# Patient Record
Sex: FEMALE | Race: WHITE | NOT HISPANIC OR LATINO | URBAN - METROPOLITAN AREA
[De-identification: names, ages, dates, MRNs, and addresses within clinical notes are randomized per-mention and may not be internally consistent; named-entity substitution may affect disease eponyms.]

---

## 2024-10-28 NOTE — PROGRESS NOTES
Chief Complaint/Reason for Referral:  Establish Care (Low hemoglobin/Low hematocrit/)    Cecily Nicholas APRN  Provider, No Known    Records Obtained:  Records of the patients history including those obtained from unrival EMR were reviewed and summarized in detail.    Subjective    History of Present Illness    Katherine Rodriguez 44 y.o. presents to [facility] for Normocytic Anemia    Very pleasant 44-year-old female presents to the hematology department for further evaluation of her normocytic anemia.  Patient's most recent CBC reveals a white blood cell count of 8.9, hemoglobin of 8.7 hematocrit 26.9, MCV of 88, platelet count of 410.  In 2024 patient's hemoglobin was 12.6 on  patient's hemoglobin was 9.3.    Fecal occult blood testing was - 2024.    Patient's EGFR is 39 indicating stage IIIb kidney disease.    There are no iron studies, ferritin or B12 folate levels noted in record since .  Since  patient had taken iron off and on.      Patient has lost about 20 lbs not on purpose.  No abd pain, she does have heavy menses cycle for many years.  Has had PAP at health dept.  Not seen GYN in years.      Patient is  without difficulty other than anemia.  Never transfused, never had iron infusions.  Iron causes some minor GI upset.    Cancer-related family history includes Breast cancer in her paternal aunt.     Oncology/Hematology History    No history exists.     Review of Systems   Constitutional:  Positive for appetite change.   HENT: Negative.     Eyes: Negative.    Respiratory: Negative.     Cardiovascular: Negative.    Gastrointestinal: Negative.    Endocrine: Negative.    Genitourinary: Negative.    Musculoskeletal: Negative.    Skin: Negative.    Allergic/Immunologic: Negative.    Neurological: Negative.    Hematological: Negative.    Psychiatric/Behavioral:  The patient is nervous/anxious.    All other systems reviewed and are  "negative.    Current Outpatient Medications on File Prior to Visit   Medication Sig Dispense Refill    buPROPion SR (WELLBUTRIN SR) 150 MG 12 hr tablet Take 1 tablet by mouth 2 (Two) Times a Day.      Cholecalciferol (Vitamin D) 50 MCG (2000 UT) capsule Take 1 capsule by mouth Daily.      ferrous gluconate (FERGON) 240 (27 FE) MG tablet Take 1 tablet by mouth Every Other Day.      hydroCHLOROthiazide 25 MG tablet Take 1 tablet by mouth Daily.      lisinopril (PRINIVIL,ZESTRIL) 20 MG tablet Take 1 tablet by mouth Daily.       No current facility-administered medications on file prior to visit.     No Known Allergies  Past Medical History:   Diagnosis Date    Anxiety     Hypertension     Vitamin D deficiency      Past Surgical History:   Procedure Laterality Date    BLADDER REPAIR W/  SECTION      CHOLECYSTECTOMY      TONSILLECTOMY       Social History     Socioeconomic History    Marital status:    Tobacco Use    Smoking status: Every Day     Current packs/day: 1.50     Average packs/day: 1.5 packs/day for 31.8 years (47.7 ttl pk-yrs)     Types: Cigarettes     Start date:    Vaping Use    Vaping status: Never Used   Substance and Sexual Activity    Alcohol use: Not Currently    Sexual activity: Defer     Family History   Problem Relation Age of Onset    Diabetes Mother     Hypertension Father     Breast cancer Paternal Aunt     Diabetes Maternal Grandmother      Immunization History   Administered Date(s) Administered    Fluzone (or Fluarix & Flulaval for VFC) >6mos 2020    Tdap 10/20/2020     Tobacco Use: High Risk (10/29/2024)    Patient History     Smoking Tobacco Use: Every Day     Smokeless Tobacco Use: Unknown     Passive Exposure: Not on file     Objective     Vitals:    10/29/24 1113   BP: 116/63   Pulse: 97   Resp: 16   Temp: 98 °F (36.7 °C)   TempSrc: Temporal   SpO2: 100%   Weight: 93.1 kg (205 lb 3.2 oz)   Height: 163.8 cm (64.5\")   PainSc: 0-No pain      Physical Exam  Vitals " and nursing note reviewed.   Constitutional:       General: She is not in acute distress.     Appearance: Normal appearance. She is not ill-appearing.   HENT:      Head: Normocephalic.   Eyes:      Conjunctiva/sclera: Conjunctivae normal.   Cardiovascular:      Rate and Rhythm: Normal rate and regular rhythm.      Heart sounds: Normal heart sounds.   Pulmonary:      Effort: Pulmonary effort is normal.      Breath sounds: Normal breath sounds.   Abdominal:      Palpations: There is no hepatomegaly or splenomegaly.   Lymphadenopathy:      Cervical: No cervical adenopathy.      Right cervical: No superficial cervical adenopathy.     Left cervical: No superficial cervical adenopathy.      Upper Body:      Right upper body: No axillary adenopathy.      Left upper body: No axillary adenopathy.   Skin:     Coloration: Skin is not jaundiced.   Neurological:      Mental Status: She is alert.   Psychiatric:         Mood and Affect: Mood normal.         Behavior: Behavior normal. Behavior is cooperative.         Thought Content: Thought content normal.         Judgment: Judgment normal.       Wt Readings from Last 3 Encounters:   10/29/24 93.1 kg (205 lb 3.2 oz)      Body mass index is 34.68 kg/m².    ECOG score: 0         ECOG: (0) Fully Active - Able to Carry On All Pre-disease Performance Without Restriction  Fall Risk Assessment was completed, and patient is at low risk for falls.  PHQ-9 Total Score:         The patient is  experiencing fatigue. Fatigue score: 7    PT/OT Functional Screening:   Speech Functional Screening:   Rehab to be ordered:     Vitals:    10/29/24 1113   PainSc: 0-No pain           Katherine Rodriguez reports a pain score of 0.       PHQ-2 Depression Screening  Little interest or pleasure in doing things? Not at all   Feeling down, depressed, or hopeless? Not at all   PHQ-2 Total Score 0     Result Review :  The following data was reviewed by: Xavier Molina PA-C on 10/29/2024:    LABS SCANNED  (10/17/2024)  LABS SCANNED (10/07/2024)  LABS SCANNED (09/30/2024)  LABS SCANNED (03/22/2024)    No Images in the past 120 days found..       Assessment and Plan:  Diagnoses and all orders for this visit:    1. Normocytic anemia (Primary)  -     H. Pylori Antigen, Stool - Stool, Per Rectum  -     Vitamin B12 & Folate  -     Comprehensive Metabolic Panel  -     Urinalysis With Microscopic - Urine, Clean Catch  -     Occult Blood, Fecal By Immunoassay - Stool, Per Rectum  -     Reticulocytes  -     Lactate Dehydrogenase  -     Haptoglobin  -     TSH+Free T4  -     Iron Profile  -     Ferritin  -     CBC & Differential  -     Peripheral Blood Smear  -     Sedimentation Rate  -     Ambulatory Referral to Gynecology  -     US Abdomen Complete; Future  -     Manual Differential  -     LSAC Slide Creation  -     Pathology Consultation    2. Menorrhagia with regular cycle  Comments:  Recommend further evaluation with PCP/GYN - ASAP  Orders:  -     Ambulatory Referral to Gynecology    3. Tobacco use  Comments:  D/C use    4. Elevated LFTs  -     Hepatitis Panel, Acute  -     US Abdomen Complete; Future    5. Stage 3b chronic kidney disease  Comments:  Push fluids, and recommend discussion with PCP regarding nephrology consult    6. Elevated ferritin  -     Hemochromatosis Mutation    7. Anemia due to folic acid deficiency, unspecified deficiency type  -     folic acid (FOLVITE) 1 MG tablet; Take 1 tablet by mouth Daily.  Dispense: 90 tablet; Refill: 1      -Encouraged patient to remain up to date on all recommended preventative medicine services specific for their sex and age.  Some examples include:  Diabetes screening, Hypertensive screening, Immunizations, Lipid screenings (cholesterol), Depression screenings, Colorectal cancer screening, HIV screening, Cervical cancer screening, Breast cancer screening, Osteoporosis screening, Alcohol screening, Dental screening, Tobacco Use screening and Dietary screening  -Obtain labs:   Haptoglobin and LDH checking for hemolysis, retic count to assess bone marrow response, UA and stool studies to determine any occult bleeding source, peripheral blood smear to assess morphology of cells.  Sed rate (ESR) to assess inflammatory status, TSH/T4 for thyroid function.  Iron profile, ferritin, B12/Folate levels.  -Discussed iron infusions with patient, she will consider it.  -Discussed consult with GYN to discuss her heavy menses.    Patient Follow Up: 2 months with MD CONNOR spent 45 minutes caring for Katherine on this date of service. This time includes time spent by me in the following activities:preparing for the visit, reviewing tests, obtaining and/or reviewing a separately obtained history, performing a medically appropriate examination and/or evaluation , counseling and educating the patient/family/caregiver, ordering medications, tests, or procedures, documenting information in the medical record, and independently interpreting results and communicating that information with the patient/family/caregiver    Patient was given instructions and counseling regarding her condition or for health maintenance advice. Please see specific information pulled into the AVS if appropriate.

## 2024-10-29 ENCOUNTER — CONSULT (OUTPATIENT)
Dept: ONCOLOGY | Facility: HOSPITAL | Age: 44
End: 2024-10-29
Payer: COMMERCIAL

## 2024-10-29 ENCOUNTER — LAB (OUTPATIENT)
Dept: ONCOLOGY | Facility: HOSPITAL | Age: 44
End: 2024-10-29
Payer: COMMERCIAL

## 2024-10-29 VITALS
SYSTOLIC BLOOD PRESSURE: 116 MMHG | TEMPERATURE: 98 F | RESPIRATION RATE: 16 BRPM | DIASTOLIC BLOOD PRESSURE: 63 MMHG | BODY MASS INDEX: 34.19 KG/M2 | HEIGHT: 65 IN | OXYGEN SATURATION: 100 % | WEIGHT: 205.2 LBS | HEART RATE: 97 BPM

## 2024-10-29 DIAGNOSIS — R79.89 ELEVATED FERRITIN: ICD-10-CM

## 2024-10-29 DIAGNOSIS — N92.0 MENORRHAGIA WITH REGULAR CYCLE: ICD-10-CM

## 2024-10-29 DIAGNOSIS — Z72.0 TOBACCO USE: ICD-10-CM

## 2024-10-29 DIAGNOSIS — D64.9 ANEMIA, UNSPECIFIED TYPE: Primary | ICD-10-CM

## 2024-10-29 DIAGNOSIS — D52.9 ANEMIA DUE TO FOLIC ACID DEFICIENCY, UNSPECIFIED DEFICIENCY TYPE: ICD-10-CM

## 2024-10-29 DIAGNOSIS — N18.32 STAGE 3B CHRONIC KIDNEY DISEASE: ICD-10-CM

## 2024-10-29 DIAGNOSIS — R79.89 ELEVATED LFTS: ICD-10-CM

## 2024-10-29 DIAGNOSIS — D64.9 NORMOCYTIC ANEMIA: Primary | ICD-10-CM

## 2024-10-29 LAB
ALBUMIN SERPL-MCNC: 4 G/DL (ref 3.5–5.2)
ALBUMIN/GLOB SERPL: 1.1 G/DL
ALP SERPL-CCNC: 247 U/L (ref 39–117)
ALT SERPL W P-5'-P-CCNC: 92 U/L (ref 1–33)
ANION GAP SERPL CALCULATED.3IONS-SCNC: 7 MMOL/L (ref 5–15)
AST SERPL-CCNC: 88 U/L (ref 1–32)
BACTERIA UR QL AUTO: ABNORMAL /HPF
BASOPHILS # BLD AUTO: 0.02 10*3/MM3 (ref 0–0.2)
BASOPHILS NFR BLD AUTO: 0.2 % (ref 0–1.5)
BILIRUB SERPL-MCNC: 0.5 MG/DL (ref 0–1.2)
BILIRUB UR QL STRIP: NEGATIVE
BUN SERPL-MCNC: 43 MG/DL (ref 6–20)
BUN/CREAT SERPL: 21.9 (ref 7–25)
CALCIUM SPEC-SCNC: 9.3 MG/DL (ref 8.6–10.5)
CHLORIDE SERPL-SCNC: 101 MMOL/L (ref 98–107)
CLARITY UR: ABNORMAL
CO2 SERPL-SCNC: 27 MMOL/L (ref 22–29)
COLOR UR: ABNORMAL
CREAT SERPL-MCNC: 1.96 MG/DL (ref 0.57–1)
DEPRECATED RDW RBC AUTO: 50.7 FL (ref 37–54)
EGFRCR SERPLBLD CKD-EPI 2021: 31.8 ML/MIN/1.73
EOSINOPHIL # BLD AUTO: 0.08 10*3/MM3 (ref 0–0.4)
EOSINOPHIL NFR BLD AUTO: 1 % (ref 0.3–6.2)
ERYTHROCYTE [DISTWIDTH] IN BLOOD BY AUTOMATED COUNT: 15.3 % (ref 12.3–15.4)
ERYTHROCYTE [SEDIMENTATION RATE] IN BLOOD: 15 MM/HR (ref 0–20)
FERRITIN SERPL-MCNC: 606.8 NG/ML (ref 13–150)
FOLATE SERPL-MCNC: 3.4 NG/ML (ref 4.78–24.2)
GLOBULIN UR ELPH-MCNC: 3.6 GM/DL
GLUCOSE SERPL-MCNC: 94 MG/DL (ref 65–99)
GLUCOSE UR STRIP-MCNC: NEGATIVE MG/DL
HAPTOGLOB SERPL-MCNC: 348 MG/DL (ref 30–200)
HAV IGM SERPL QL IA: NORMAL
HBV CORE IGM SERPL QL IA: NORMAL
HBV SURFACE AG SERPL QL IA: NORMAL
HCT VFR BLD AUTO: 26.1 % (ref 34–46.6)
HCV AB SER QL: NORMAL
HGB BLD-MCNC: 8.2 G/DL (ref 12–15.9)
HGB UR QL STRIP.AUTO: NEGATIVE
HYALINE CASTS UR QL AUTO: ABNORMAL /LPF
IMM GRANULOCYTES # BLD AUTO: 0.03 10*3/MM3 (ref 0–0.05)
IMM GRANULOCYTES NFR BLD AUTO: 0.4 % (ref 0–0.5)
IRON 24H UR-MRATE: 26 MCG/DL (ref 37–145)
IRON SATN MFR SERPL: 7 % (ref 20–50)
KETONES UR QL STRIP: NEGATIVE
LDH SERPL-CCNC: 434 U/L (ref 135–214)
LEUKOCYTE ESTERASE UR QL STRIP.AUTO: ABNORMAL
LYMPHOCYTES # BLD AUTO: 1.53 10*3/MM3 (ref 0.7–3.1)
LYMPHOCYTES # BLD MANUAL: 1.41 10*3/MM3 (ref 0.7–3.1)
LYMPHOCYTES NFR BLD AUTO: 18.5 % (ref 19.6–45.3)
LYMPHOCYTES NFR BLD MANUAL: 8 % (ref 5–12)
MCH RBC QN AUTO: 28 PG (ref 26.6–33)
MCHC RBC AUTO-ENTMCNC: 31.4 G/DL (ref 31.5–35.7)
MCV RBC AUTO: 89.1 FL (ref 79–97)
MONOCYTES # BLD AUTO: 0.64 10*3/MM3 (ref 0.1–0.9)
MONOCYTES # BLD: 0.66 10*3/MM3 (ref 0.1–0.9)
MONOCYTES NFR BLD AUTO: 7.7 % (ref 5–12)
NEUTROPHILS # BLD AUTO: 6.22 10*3/MM3 (ref 1.7–7)
NEUTROPHILS NFR BLD AUTO: 5.99 10*3/MM3 (ref 1.7–7)
NEUTROPHILS NFR BLD AUTO: 72.2 % (ref 42.7–76)
NEUTROPHILS NFR BLD MANUAL: 75 % (ref 42.7–76)
NITRITE UR QL STRIP: NEGATIVE
PATHOLOGY REVIEW: YES
PH UR STRIP.AUTO: 5.5 [PH] (ref 5–8)
PLAT MORPH BLD: NORMAL
PLATELET # BLD AUTO: 391 10*3/MM3 (ref 140–450)
PMV BLD AUTO: 9.8 FL (ref 6–12)
POTASSIUM SERPL-SCNC: 4.4 MMOL/L (ref 3.5–5.2)
PROT SERPL-MCNC: 7.6 G/DL (ref 6–8.5)
PROT UR QL STRIP: ABNORMAL
RBC # BLD AUTO: 2.93 10*6/MM3 (ref 3.77–5.28)
RBC # UR STRIP: ABNORMAL /HPF
RBC MORPH BLD: NORMAL
REF LAB TEST METHOD: ABNORMAL
RETICS # AUTO: 0.07 10*6/MM3 (ref 0.02–0.13)
RETICS/RBC NFR AUTO: 2.21 % (ref 0.7–1.9)
SODIUM SERPL-SCNC: 135 MMOL/L (ref 136–145)
SP GR UR STRIP: 1.02 (ref 1–1.03)
SQUAMOUS #/AREA URNS HPF: ABNORMAL /HPF
T4 FREE SERPL-MCNC: 1.68 NG/DL (ref 0.92–1.68)
TIBC SERPL-MCNC: 361 MCG/DL (ref 298–536)
TRANSFERRIN SERPL-MCNC: 242 MG/DL (ref 200–360)
TSH SERPL DL<=0.05 MIU/L-ACNC: 0.88 UIU/ML (ref 0.27–4.2)
UROBILINOGEN UR QL STRIP: ABNORMAL
VARIANT LYMPHS NFR BLD MANUAL: 17 % (ref 19.6–45.3)
VIT B12 BLD-MCNC: 598 PG/ML (ref 211–946)
WBC # UR STRIP: ABNORMAL /HPF
WBC MORPH BLD: NORMAL
WBC NRBC COR # BLD AUTO: 8.29 10*3/MM3 (ref 3.4–10.8)

## 2024-10-29 PROCEDURE — 83615 LACTATE (LD) (LDH) ENZYME: CPT | Performed by: PHYSICIAN ASSISTANT

## 2024-10-29 PROCEDURE — G0463 HOSPITAL OUTPT CLINIC VISIT: HCPCS | Performed by: PHYSICIAN ASSISTANT

## 2024-10-29 PROCEDURE — 85025 COMPLETE CBC W/AUTO DIFF WBC: CPT | Performed by: PHYSICIAN ASSISTANT

## 2024-10-29 PROCEDURE — 80053 COMPREHEN METABOLIC PANEL: CPT | Performed by: PHYSICIAN ASSISTANT

## 2024-10-29 PROCEDURE — 82728 ASSAY OF FERRITIN: CPT | Performed by: PHYSICIAN ASSISTANT

## 2024-10-29 PROCEDURE — 80074 ACUTE HEPATITIS PANEL: CPT | Performed by: PHYSICIAN ASSISTANT

## 2024-10-29 PROCEDURE — 82607 VITAMIN B-12: CPT | Performed by: PHYSICIAN ASSISTANT

## 2024-10-29 PROCEDURE — 84443 ASSAY THYROID STIM HORMONE: CPT | Performed by: PHYSICIAN ASSISTANT

## 2024-10-29 PROCEDURE — 81256 HFE GENE: CPT | Performed by: PHYSICIAN ASSISTANT

## 2024-10-29 PROCEDURE — 83010 ASSAY OF HAPTOGLOBIN QUANT: CPT | Performed by: PHYSICIAN ASSISTANT

## 2024-10-29 PROCEDURE — 84466 ASSAY OF TRANSFERRIN: CPT | Performed by: PHYSICIAN ASSISTANT

## 2024-10-29 PROCEDURE — 82746 ASSAY OF FOLIC ACID SERUM: CPT | Performed by: PHYSICIAN ASSISTANT

## 2024-10-29 PROCEDURE — 85045 AUTOMATED RETICULOCYTE COUNT: CPT | Performed by: PHYSICIAN ASSISTANT

## 2024-10-29 PROCEDURE — 83540 ASSAY OF IRON: CPT | Performed by: PHYSICIAN ASSISTANT

## 2024-10-29 PROCEDURE — 84439 ASSAY OF FREE THYROXINE: CPT | Performed by: PHYSICIAN ASSISTANT

## 2024-10-29 PROCEDURE — 81001 URINALYSIS AUTO W/SCOPE: CPT | Performed by: PHYSICIAN ASSISTANT

## 2024-10-29 PROCEDURE — 36415 COLL VENOUS BLD VENIPUNCTURE: CPT | Performed by: PHYSICIAN ASSISTANT

## 2024-10-29 PROCEDURE — 85652 RBC SED RATE AUTOMATED: CPT | Performed by: PHYSICIAN ASSISTANT

## 2024-10-29 RX ORDER — LISINOPRIL 20 MG/1
20 TABLET ORAL DAILY
COMMUNITY

## 2024-10-29 RX ORDER — BUPROPION HYDROCHLORIDE 150 MG/1
150 TABLET, EXTENDED RELEASE ORAL ONCE
COMMUNITY

## 2024-10-29 RX ORDER — MULTIVIT-MIN/IRON/FOLIC ACID/K 18-600-40
1 CAPSULE ORAL DAILY
COMMUNITY

## 2024-10-29 RX ORDER — QUINIDINE GLUCONATE 324 MG
240 TABLET, EXTENDED RELEASE ORAL EVERY OTHER DAY
COMMUNITY

## 2024-10-29 RX ORDER — HYDROCHLOROTHIAZIDE 25 MG/1
25 TABLET ORAL DAILY
COMMUNITY

## 2024-10-30 ENCOUNTER — TELEPHONE (OUTPATIENT)
Dept: ONCOLOGY | Facility: HOSPITAL | Age: 44
End: 2024-10-30

## 2024-10-30 LAB
CYTO UR: NORMAL
LAB AP CASE REPORT: NORMAL
LAB AP CLINICAL INFORMATION: NORMAL
PATH REPORT.FINAL DX SPEC: NORMAL
PATH REPORT.GROSS SPEC: NORMAL

## 2024-10-30 RX ORDER — FOLIC ACID 1 MG/1
1 TABLET ORAL DAILY
Qty: 90 TABLET | Refills: 1 | Status: SHIPPED | OUTPATIENT
Start: 2024-10-30

## 2024-10-30 NOTE — TELEPHONE ENCOUNTER
Caller: Katherine Rodriguez    Relationship: Self    Best call back number: [phone] CALL AFTER 4 PM    What test was performed: LAB    When was the test performed: 10/29    Where was the test performed: [facility]

## 2024-10-31 ENCOUNTER — PATIENT ROUNDING (BHMG ONLY) (OUTPATIENT)
Dept: ONCOLOGY | Facility: HOSPITAL | Age: 44
End: 2024-10-31
Payer: COMMERCIAL

## 2024-10-31 NOTE — PROGRESS NOTES
October 31, 2024    Hello, may I speak with Katherine Rodriguez?    My name is Coleen.      I am  with [facility]  [address] [place], [place] [address]  [place] [address] [phone].    Before we get started may I verify your date of birth? 1980    I am calling to officially welcome you to our practice and ask about your recent visit. Is this a good time to talk? NO- Sent [employer] Message    Tell me about your visit with us. What things went well?         We're always looking for ways to make our patients' experiences even better. Do you have recommendations on ways we may improve?      Overall were you satisfied with your first visit to our practice?        I appreciate you taking the time to speak with me today. Is there anything else I can do for you?       Thank you, and have a great day.

## 2024-11-01 ENCOUNTER — TELEPHONE (OUTPATIENT)
Dept: OBSTETRICS AND GYNECOLOGY | Facility: CLINIC | Age: 44
End: 2024-11-01

## 2024-11-01 ENCOUNTER — OFFICE VISIT (OUTPATIENT)
Dept: OBSTETRICS AND GYNECOLOGY | Facility: CLINIC | Age: 44
End: 2024-11-01
Payer: COMMERCIAL

## 2024-11-01 VITALS
DIASTOLIC BLOOD PRESSURE: 74 MMHG | HEART RATE: 83 BPM | BODY MASS INDEX: 33.66 KG/M2 | HEIGHT: 65 IN | SYSTOLIC BLOOD PRESSURE: 115 MMHG | WEIGHT: 202 LBS

## 2024-11-01 DIAGNOSIS — Z12.4 PAPANICOLAOU SMEAR: ICD-10-CM

## 2024-11-01 DIAGNOSIS — D50.9 IRON DEFICIENCY ANEMIA, UNSPECIFIED IRON DEFICIENCY ANEMIA TYPE: ICD-10-CM

## 2024-11-01 DIAGNOSIS — N92.0 MENORRHAGIA WITH REGULAR CYCLE: Primary | ICD-10-CM

## 2024-11-01 PROCEDURE — G0123 SCREEN CERV/VAG THIN LAYER: HCPCS | Performed by: NURSE PRACTITIONER

## 2024-11-01 PROCEDURE — 87624 HPV HI-RISK TYP POOLED RSLT: CPT | Performed by: NURSE PRACTITIONER

## 2024-11-01 NOTE — TELEPHONE ENCOUNTER
At check out patient advised, check out staff (Danay Matson) she wanted to have her US in [place], as that is closer to her.  Called [facility] [phone] (option for scheduling) obtained fax# [phone] - faxed Ultrasound order, confirmation received and scanned to chart.   Patient was called, rcsushma her voicemail:  Left messge advising the Ultrasound order had been faxed to [facility] there in [place] and she would have to call and schedule or go by to schedule the Ultrasound.  Once she has the Ultrasound and we receive the report, show would be called with the results.    Also, sent patient a [employer] message, as well

## 2024-11-01 NOTE — PROGRESS NOTES
GYN Problem/Follow Up Visit    Chief Complaint   Patient presents with    Menorrhagia           HPI  Katherine Rodriguez is a 44 y.o. female, , who presents for was seen by PCP for fatigue, diagnosed with anemia, elevated liver enzymes, elevated kidney markers.   Has experienced heavy periods with blood clots all her life.   Menses monthly, lasting 7-9 days, on heavy days change super tampon every 3-4 hours. Mod to severe menstrual cramps.     Tubal ligation after last child   Nicotine dependence   Started on iron and folic acid supplement    Vitamin B12 & Folate (10/29/2024 12:08)    TSH+Free T4 (10/29/2024 12:08)    Iron Profile (10/29/2024 12:08)     Comprehensive Metabolic Panel (10/29/2024 12:08)     Not sexually active over 10 years, denies concerns for STI     Additional OB/GYN History   Patient's last menstrual period was 10/14/2024.  Current contraception: contraceptive methods: Tubal ligation    Past Medical History:   Diagnosis Date    Anxiety     Hypertension     Multiple gestation 1999    Normocytic anemia     Varicella     Whwn i was a younger child.    Vitamin D deficiency       Past Surgical History:   Procedure Laterality Date    BLADDER REPAIR W/  SECTION       SECTION  2013    CHOLECYSTECTOMY      LAPAROSCOPIC CHOLECYSTECTOMY      TONSILLECTOMY      TUBAL ABDOMINAL LIGATION  2021      Family History   Problem Relation Age of Onset    Hypertension Father     Diabetes Mother     Diabetes Maternal Grandmother     Breast cancer Paternal Aunt     Ovarian cancer Neg Hx     Uterine cancer Neg Hx     Colon cancer Neg Hx     Deep vein thrombosis Neg Hx     Pulmonary embolism Neg Hx      Allergies as of 2024    (No Known Allergies)      The additional following portions of the patient's history were reviewed and updated as appropriate: allergies, current medications, past family history, past medical history, past social history, past  "surgical history, and problem list.    Review of Systems    See HPI for pertinent ROS    Objective   /74   Pulse 83   Ht 163.8 cm (64.5\")   Wt 91.6 kg (202 lb)   LMP 10/14/2024 Comment: Lasting from 7-9 days, heavy first 3 days, changing every 4 hrs, big clots  Breastfeeding No   BMI 34.14 kg/m²     Physical Exam  Vitals and nursing note reviewed. Exam conducted with a chaperone present.   Constitutional:       Appearance: Normal appearance.   Cardiovascular:      Rate and Rhythm: Normal rate.   Pulmonary:      Effort: Pulmonary effort is normal.   Genitourinary:     General: Normal vulva.      Vagina: Normal.      Cervix: Normal.      Uterus: Normal.       Adnexa: Right adnexa normal and left adnexa normal.   Lymphadenopathy:      Lower Body: No right inguinal adenopathy. No left inguinal adenopathy.   Skin:     General: Skin is warm and dry.   Neurological:      Mental Status: She is alert and oriented to person, place, and time.        Assessment and Plan    Diagnoses and all orders for this visit:    1. Menorrhagia with regular cycle (Primary)  -     US Non-ob Transvaginal; Future    2. Iron deficiency anemia, unspecified iron deficiency anemia type  -     US Non-ob Transvaginal; Future    3. Papanicolaou smear  -     IgP, Aptima HPV        Counseling:  All treatment options with regard to pt hx NLT hormonal, surgical, expectant R/B/A/SE/E   Pelvic ultrasound to evaluate for structural cause menorrhagia/DEBBY, Follow up with PCP as planned for further evaluation of anemia, abnormal kidney and liver markers       She understands the importance of having any ordered tests to be performed in a timely fashion.  The risks of not performing them include, but are not limited to, advanced cancer stages, bone loss from osteoporosis and/or subsequent increase in morbidity and/or mortality.  She is encouraged to review her results online and/or contact or office if she has questions.     Follow Up:  Return for " WWE, I will call with results of ultrasound and plan of care.        Kristie Cruz, APRN  11/01/2024

## 2024-11-05 NOTE — TELEPHONE ENCOUNTER
Spoke to patient she is scheduled to have US 11/08/24 at 03:30pm cst at [facility] in [place], [place].  Patient was readvised once we receive the report, someone would call her with the results.

## 2024-11-06 LAB
HFE GENE MUT ANL BLD/T: NORMAL
IMP & REVIEW OF LAB RESULTS: NORMAL

## 2024-11-07 PROBLEM — Z14.8 CARRIER OF HEMOCHROMATOSIS HFE GENE MUTATION: Status: ACTIVE | Noted: 2024-11-07

## 2024-11-07 LAB
CYTOLOGIST CVX/VAG CYTO: NORMAL
CYTOLOGY CVX/VAG DOC CYTO: NORMAL
CYTOLOGY CVX/VAG DOC THIN PREP: NORMAL
DX ICD CODE: NORMAL
HPV I/H RISK 4 DNA CVX QL PROBE+SIG AMP: NEGATIVE
Lab: NORMAL
OTHER STN SPEC: NORMAL
STAT OF ADQ CVX/VAG CYTO-IMP: NORMAL

## 2024-11-08 DIAGNOSIS — A59.01 TRICHOMONAS VAGINALIS (TV) INFECTION: Primary | ICD-10-CM

## 2024-11-08 RX ORDER — METRONIDAZOLE 500 MG/1
500 TABLET ORAL 2 TIMES DAILY
Qty: 14 TABLET | Refills: 0 | Status: SHIPPED | OUTPATIENT
Start: 2024-11-08 | End: 2024-11-15

## 2024-11-19 ENCOUNTER — HOSPITAL ENCOUNTER (OUTPATIENT)
Dept: OTHER | Facility: HOSPITAL | Age: 44
Discharge: HOME | End: 2024-11-19

## 2024-11-20 ENCOUNTER — TELEPHONE (OUTPATIENT)
Dept: ONCOLOGY | Facility: HOSPITAL | Age: 44
End: 2024-11-20
Payer: COMMERCIAL

## 2024-11-20 NOTE — TELEPHONE ENCOUNTER
Caller: Katherine Rodriguez    Relationship: Self    Best call back number: [phone]    What is the best time to reach you: ANYTIME    Who are you requesting to speak with (clinical staff, provider,  specific staff member): TRESSA      What was the call regarding: PT ADVISES [facility] IS NOT TAKING NEW PTS AT THIS TIME, THEY ARE ON DIVERSION.  [facility] HAS A BED AND IS WAITING FOR HER, BUT WAS TOLD THEY CANNOT HELP HER THAT SHE NEEDS TO GO TO [facility].  PT NEEDS DIRECTION ASAP

## 2024-12-02 ENCOUNTER — TELEPHONE (OUTPATIENT)
Dept: OBSTETRICS AND GYNECOLOGY | Facility: CLINIC | Age: 44
End: 2024-12-02
Payer: COMMERCIAL

## 2024-12-02 NOTE — TELEPHONE ENCOUNTER
----- Message from Kristie Anthony sent at 11/20/2024  8:05 PM EST -----  Patient has been hospitalized for a renal mass. She was seen recently by gyn and worked up for heavy and painful menses. She was diagnosed with trichomonas and scheduled for ABRAM in January, additionally Her pelvic ultrasound was reviewed which demonstrates a small benign uterine fibroid, otherwise normal. Her pap smear was normal. If you will follow up with her next week once she is discharged to let her know we can follow up with her on the results and her menstrual concerns in January at her fu appt. IF she would like to be seen sooner, please accommodate.

## 2024-12-02 NOTE — TELEPHONE ENCOUNTER
Patient called back and informed of message from Kristie. She wants to keep her appointment as scheduled for now and will call if any issues and needs to be seen sooner.

## 2024-12-13 ENCOUNTER — TELEPHONE (OUTPATIENT)
Dept: OBSTETRICS AND GYNECOLOGY | Facility: CLINIC | Age: 44
End: 2024-12-13
Payer: COMMERCIAL

## 2024-12-13 NOTE — TELEPHONE ENCOUNTER
"Provider: Kristie Cruz, APRN     Caller: Katherine Rodriguez \"CRYSTAL\"  Female, 44 y.o., 1980  MRN: [id number]  CSN: [id number]  Phone: [phone]    Relationship to Patient: SELF        Reason for Call: PT IS REQ A CALL BACK FROM CLINICAL STAFF REGARDING HER MOST RECENT LAB RESULTS, PT STATES AT THE TIME THEY WERE RESULTED TO HER SHE HAD A LOT GOING ON AND DID NOT RETAIN THE CONVERSATION       "

## 2024-12-30 ENCOUNTER — TELEPHONE (OUTPATIENT)
Dept: ONCOLOGY | Facility: HOSPITAL | Age: 44
End: 2024-12-30
Payer: COMMERCIAL

## 2024-12-30 NOTE — TELEPHONE ENCOUNTER
Caller: Katherine Rodriguez    Relationship to patient: Self    Best call back number: [phone]    Chief complaint: SCHEDULING     Type of visit: FOLLOW UP    Requested date: NEXT AVLIABLE     If rescheduling, when is the original appointment: 12-

## 2024-12-31 ENCOUNTER — TELEPHONE (OUTPATIENT)
Dept: OBSTETRICS AND GYNECOLOGY | Facility: CLINIC | Age: 44
End: 2024-12-31
Payer: COMMERCIAL

## 2025-01-23 NOTE — PROGRESS NOTES
GYN Problem/Follow Up Visit    Chief Complaint   Patient presents with    Follow-up           HPI  Katherine Rodriguez is a 44 y.o. female, , who presents for test od cure trichomonas, completed treatment, symptoms have resolved.     IgP, Aptima HPV (2024 10:35)     Burning with urination for the past 1-2 days, treated for UTI 2 weeks ago per PCP, s/p left kidney cancer     Additional OB/GYN History   Patient's last menstrual period was 2024.  Current contraception: contraceptive methods: Condoms and Tubal ligation    Past Medical History:   Diagnosis Date    Anxiety     Cancer 2024    Kidney cancer. Took out my left kidney.    History of transfusion 2024    Had to get blood for surgery and after for my hemoglobin being low.    Hypertension     Multiple gestation 1999    Normocytic anemia     Trichomonas vaginalis infection 2024    Varicella     Whwn i was a younger child.    Vitamin D deficiency       Past Surgical History:   Procedure Laterality Date    BLADDER REPAIR W/  SECTION       SECTION  2013    CHOLECYSTECTOMY      LAPAROSCOPIC CHOLECYSTECTOMY      NEPHRECTOMY RADICAL Left 2024    cancer    TONSILLECTOMY      TUBAL ABDOMINAL LIGATION  2021      Family History   Problem Relation Age of Onset    Hypertension Father     Diabetes Mother     Diabetes Maternal Grandmother     Breast cancer Paternal Aunt     Ovarian cancer Neg Hx     Uterine cancer Neg Hx     Colon cancer Neg Hx     Deep vein thrombosis Neg Hx     Pulmonary embolism Neg Hx      Allergies as of 2025    (No Known Allergies)      The additional following portions of the patient's history were reviewed and updated as appropriate: allergies, current medications, past family history, past medical history, past social history, past surgical history, and problem list.    Review of Systems    See HPI for pertinent ROS    Objective   /96   Pulse 116    Wt 75.8 kg (167 lb)   LMP 11/18/2024   BMI 28.22 kg/m²     Physical Exam  Vitals and nursing note reviewed. Exam conducted with a chaperone present.   Constitutional:       Appearance: Normal appearance.   Cardiovascular:      Rate and Rhythm: Normal rate.   Pulmonary:      Effort: Pulmonary effort is normal.   Genitourinary:     General: Normal vulva.      Vagina: Normal. Vaginal discharge (dark old blood in vault) present.      Cervix: Normal.      Uterus: Normal.       Adnexa: Right adnexa normal and left adnexa normal.   Lymphadenopathy:      Lower Body: No right inguinal adenopathy. No left inguinal adenopathy.   Skin:     General: Skin is warm and dry.   Neurological:      Mental Status: She is alert and oriented to person, place, and time.       Assessment and Plan    Diagnoses and all orders for this visit:    1. Trichomonas vaginalis (TV) infection (Primary)  -     Gardnerella vaginalis, Trichomonas vaginalis, Candida albicans, DNA - Swab, Vagina    2. Dysuria  -     Urinalysis With Culture If Indicated -      Counseling:  SAFE SEX/condoms importance reviewed.    Increase water an limit caffeine, await urine culture    Follow Up:  Return if symptoms worsen or fail to improve.        Kristie Cruz, APRN  02/04/2025

## 2025-02-04 ENCOUNTER — OFFICE VISIT (OUTPATIENT)
Dept: ONCOLOGY | Facility: HOSPITAL | Age: 45
End: 2025-02-04
Payer: COMMERCIAL

## 2025-02-04 ENCOUNTER — OFFICE VISIT (OUTPATIENT)
Dept: OBSTETRICS AND GYNECOLOGY | Facility: CLINIC | Age: 45
End: 2025-02-04
Payer: COMMERCIAL

## 2025-02-04 VITALS
SYSTOLIC BLOOD PRESSURE: 149 MMHG | WEIGHT: 168.87 LBS | TEMPERATURE: 98 F | BODY MASS INDEX: 28.83 KG/M2 | HEART RATE: 92 BPM | HEIGHT: 64 IN | DIASTOLIC BLOOD PRESSURE: 94 MMHG | OXYGEN SATURATION: 100 % | RESPIRATION RATE: 18 BRPM

## 2025-02-04 VITALS
DIASTOLIC BLOOD PRESSURE: 89 MMHG | BODY MASS INDEX: 28.22 KG/M2 | WEIGHT: 167 LBS | HEART RATE: 91 BPM | SYSTOLIC BLOOD PRESSURE: 135 MMHG

## 2025-02-04 DIAGNOSIS — E83.110 HEREDITARY HEMOCHROMATOSIS: ICD-10-CM

## 2025-02-04 DIAGNOSIS — N17.9 AKI (ACUTE KIDNEY INJURY): ICD-10-CM

## 2025-02-04 DIAGNOSIS — A59.01 TRICHOMONAS VAGINALIS (TV) INFECTION: Primary | ICD-10-CM

## 2025-02-04 DIAGNOSIS — C64.2 RENAL CELL CARCINOMA OF LEFT KIDNEY: Primary | ICD-10-CM

## 2025-02-04 DIAGNOSIS — R30.0 DYSURIA: ICD-10-CM

## 2025-02-04 DIAGNOSIS — D64.9 ANEMIA, UNSPECIFIED TYPE: ICD-10-CM

## 2025-02-04 LAB
BACTERIA UR QL AUTO: ABNORMAL /HPF
BILIRUB UR QL STRIP: NEGATIVE
CANDIDA SPECIES: NEGATIVE
CLARITY UR: ABNORMAL
COLOR UR: YELLOW
GARDNERELLA VAGINALIS: NEGATIVE
GLUCOSE UR STRIP-MCNC: NEGATIVE MG/DL
HGB UR QL STRIP.AUTO: ABNORMAL
HOLD SPECIMEN: NORMAL
HYALINE CASTS UR QL AUTO: ABNORMAL /LPF
KETONES UR QL STRIP: ABNORMAL
LEUKOCYTE ESTERASE UR QL STRIP.AUTO: ABNORMAL
NITRITE UR QL STRIP: NEGATIVE
PH UR STRIP.AUTO: 6.5 [PH] (ref 5–8)
PROT UR QL STRIP: ABNORMAL
RBC # UR STRIP: ABNORMAL /HPF
REF LAB TEST METHOD: ABNORMAL
SP GR UR STRIP: 1.02 (ref 1–1.03)
SQUAMOUS #/AREA URNS HPF: ABNORMAL /HPF
T VAGINALIS DNA VAG QL PROBE+SIG AMP: NEGATIVE
UROBILINOGEN UR QL STRIP: ABNORMAL
WBC # UR STRIP: ABNORMAL /HPF

## 2025-02-04 PROCEDURE — 81001 URINALYSIS AUTO W/SCOPE: CPT | Performed by: NURSE PRACTITIONER

## 2025-02-04 PROCEDURE — 87660 TRICHOMONAS VAGIN DIR PROBE: CPT | Performed by: NURSE PRACTITIONER

## 2025-02-04 PROCEDURE — 87480 CANDIDA DNA DIR PROBE: CPT | Performed by: NURSE PRACTITIONER

## 2025-02-04 PROCEDURE — G0463 HOSPITAL OUTPT CLINIC VISIT: HCPCS | Performed by: INTERNAL MEDICINE

## 2025-02-04 PROCEDURE — 87086 URINE CULTURE/COLONY COUNT: CPT | Performed by: NURSE PRACTITIONER

## 2025-02-04 PROCEDURE — 87510 GARDNER VAG DNA DIR PROBE: CPT | Performed by: NURSE PRACTITIONER

## 2025-02-04 RX ORDER — ONDANSETRON 8 MG/1
8 TABLET, FILM COATED ORAL 3 TIMES DAILY PRN
Qty: 30 TABLET | Refills: 5 | Status: SHIPPED | OUTPATIENT
Start: 2025-02-04

## 2025-02-04 RX ORDER — APIXABAN 5 MG/1
5 TABLET, FILM COATED ORAL 2 TIMES DAILY
COMMUNITY
Start: 2024-12-15

## 2025-02-04 RX ORDER — SODIUM CHLORIDE 9 MG/ML
20 INJECTION, SOLUTION INTRAVENOUS ONCE
OUTPATIENT
Start: 2025-02-04

## 2025-02-04 NOTE — PROGRESS NOTES
Chief Complaint/Reason for Referral:  FOLLOW UP 2    Provider, No Known  Provider, No Known    Records Obtained:  Records of the patients history including those obtained from [employer] EMR were reviewed and summarized in detail.    Subjective    History of Present Illness    Katherine Rodriguez 44 y.o. presents to [facility] for Normocytic Anemia    Very pleasant 44-year-old female presents to the hematology department for further evaluation of her normocytic anemia.  Hx of CKD IIIb. No abd pain, she does have heavy menses cycle for many years.  Has had PAP at health dept.     Patient had radical left nephrectomy on 6 December 2024.  This showed clear-cell renal cell carcinoma with 0 of 17 lymph nodes involved.  Renal vein margin positive for carcinoma.  Adrenal gland with no malignancy.  Pathologic pT3b N0.  Grade 4.  Stage III.    Patient has recovered well from surgery.  Denies any bleeding.  Still has stent in place.  Has seen Dr. Deal with urology for follow-up.  Has follow-up planned on the 18th as well.  No acute concerns today.  Agreeable to starting immunotherapy for her RCC.     Hematology History  Patient's most recent CBC reveals a white blood cell count of 8.9, hemoglobin of 8.7 hematocrit 26.9, MCV of 88, platelet count of 410.  In March 2024 patient's hemoglobin was 12.6 on October 1 patient's hemoglobin was 9.3.    October 7, 2024: FOBT negative    10/29/24: WBC 8.29, hgb 8.2, plt 391. Ferritin 606,  iron sat 7%, TIBC 361. LDH and hapto elevated. B12 normal at 598, folate low at 3.4 (supplementation started)  Hemochromatosis testing: c.845G>A (p.Nyp684Hcf) - Detected, heterozygous   c.187C>G (p.Cpj60Hrd) - Detected, heterozygous   c.193A>T (p.Str99Hyz) - Not Detected         Cancer-related family history includes Breast cancer in her paternal aunt. There is no history of Ovarian cancer, Uterine cancer, or Colon cancer.     Oncology/Hematology History    No history  exists.     Review of Systems   Constitutional:  Negative for appetite change, diaphoresis, fatigue, fever, unexpected weight gain and unexpected weight loss.   HENT: Negative.  Negative for hearing loss, mouth sores, sore throat, swollen glands, trouble swallowing and voice change.    Eyes: Negative.  Negative for blurred vision.   Respiratory: Negative.  Negative for cough, shortness of breath and wheezing.    Cardiovascular: Negative.  Negative for chest pain and palpitations.   Gastrointestinal: Negative.  Negative for abdominal pain, blood in stool, constipation, diarrhea, nausea and vomiting.   Endocrine: Negative.  Negative for cold intolerance and heat intolerance.   Genitourinary: Negative.  Negative for difficulty urinating, dysuria, frequency, hematuria and urinary incontinence.   Musculoskeletal: Negative.  Negative for arthralgias, back pain and myalgias.   Skin: Negative.  Negative for rash, skin lesions and wound.   Allergic/Immunologic: Negative.    Neurological: Negative.  Negative for dizziness, seizures, weakness, numbness and headache.   Hematological: Negative.  Does not bruise/bleed easily.   Psychiatric/Behavioral:  Negative for depressed mood. The patient is nervous/anxious.    All other systems reviewed and are negative.    Current Outpatient Medications on File Prior to Visit   Medication Sig Dispense Refill    buPROPion SR (WELLBUTRIN SR) 150 MG 12 hr tablet Take 1 tablet by mouth 1 (One) Time. Once a day      Cholecalciferol (Vitamin D) 50 MCG (2000 UT) capsule Take 1 capsule by mouth Daily.      ferrous gluconate (FERGON) 240 (27 FE) MG tablet Take 1 tablet by mouth Every Other Day.      folic acid (FOLVITE) 1 MG tablet Take 1 tablet by mouth Daily. 90 tablet 1    hydroCHLOROthiazide 25 MG tablet Take 1 tablet by mouth Daily.      lisinopril (PRINIVIL,ZESTRIL) 20 MG tablet Take 1 tablet by mouth Daily.       No current facility-administered medications on file prior to visit.     No  Known Allergies  Past Medical History:   Diagnosis Date    Anxiety     Cancer 2024    Kidney cancer. Took out my left kidney.    History of transfusion 2024    Had to get blood for surgery and after for my hemoglobin being low.    Hypertension     Multiple gestation 1999    Normocytic anemia     Trichomonas vaginalis infection 2024    Varicella     Whwn i was a younger child.    Vitamin D deficiency      Past Surgical History:   Procedure Laterality Date    BLADDER REPAIR W/  SECTION       SECTION  2013    CHOLECYSTECTOMY      LAPAROSCOPIC CHOLECYSTECTOMY      NEPHRECTOMY RADICAL Left 2024    cancer    TONSILLECTOMY      TUBAL ABDOMINAL LIGATION  2021     Social History     Socioeconomic History    Marital status:    Tobacco Use    Smoking status: Every Day     Current packs/day: 1.50     Average packs/day: 1.5 packs/day for 32.1 years (48.1 ttl pk-yrs)     Types: Cigarettes     Start date: 1993   Vaping Use    Vaping status: Never Used   Substance and Sexual Activity    Alcohol use: Never    Drug use: Never    Sexual activity: Not Currently     Partners: Male     Birth control/protection: Condom, Tubal ligation     Family History   Problem Relation Age of Onset    Hypertension Father     Diabetes Mother     Diabetes Maternal Grandmother     Breast cancer Paternal Aunt     Ovarian cancer Neg Hx     Uterine cancer Neg Hx     Colon cancer Neg Hx     Deep vein thrombosis Neg Hx     Pulmonary embolism Neg Hx      Immunization History   Administered Date(s) Administered    Fluzone (or Fluarix & Flulaval for VFC) >6mos 2020    Tdap 10/20/2020     Tobacco Use: High Risk (2025)    Patient History     Smoking Tobacco Use: Every Day     Smokeless Tobacco Use: Unknown     Passive Exposure: Not on file     Objective     Vitals:    25 1445   BP: 149/94   Pulse: 92   Resp: 18   Temp: 98 °F (36.7 °C)   TempSrc: Temporal   SpO2:  "100%   Weight: 76.6 kg (168 lb 14 oz)   Height: 163.8 cm (64.49\")   PainSc: 0-No pain        Physical Exam  Vitals and nursing note reviewed.   Constitutional:       General: She is not in acute distress.     Appearance: Normal appearance. She is not ill-appearing.   HENT:      Head: Normocephalic.   Eyes:      Conjunctiva/sclera: Conjunctivae normal.   Cardiovascular:      Rate and Rhythm: Normal rate and regular rhythm.      Heart sounds: Normal heart sounds.   Pulmonary:      Effort: Pulmonary effort is normal.      Breath sounds: Normal breath sounds.   Abdominal:      Palpations: There is no hepatomegaly or splenomegaly.   Lymphadenopathy:      Cervical: No cervical adenopathy.      Right cervical: No superficial cervical adenopathy.     Left cervical: No superficial cervical adenopathy.      Upper Body:      Right upper body: No axillary adenopathy.      Left upper body: No axillary adenopathy.   Skin:     Coloration: Skin is not jaundiced.   Neurological:      Mental Status: She is alert.   Psychiatric:         Mood and Affect: Mood normal.         Behavior: Behavior normal. Behavior is cooperative.         Thought Content: Thought content normal.         Judgment: Judgment normal.     Wt Readings from Last 3 Encounters:   02/04/25 75.8 kg (167 lb)   11/01/24 91.6 kg (202 lb)   10/29/24 93.1 kg (205 lb 3.2 oz)      Body mass index is 28.55 kg/m².              ECOG: (0) Fully Active - Able to Carry On All Pre-disease Performance Without Restriction  Fall Risk Assessment was completed, and patient is at low risk for falls.  PHQ-9 Total Score:         The patient is  experiencing fatigue. Fatigue score: 7    PT/OT Functional Screening:   Speech Functional Screening:   Rehab to be ordered:         Result Review :  The following data was reviewed by: Guy Layne MD on 02/04/25:    LABS SCANNED (10/17/2024)  LABS SCANNED (10/07/2024)  LABS SCANNED (09/30/2024)  LABS SCANNED (03/22/2024)    Labs " personally reviewed. Notable for anemia    Pathology report personally reviewed    Urology note personally reviewed           Assessment and Plan:  Diagnoses and all orders for this visit:    1. Renal cell carcinoma of left kidney (Primary)  -     Ambulatory Referral to Nephrology  -     CBC and Differential; Future  -     Comprehensive metabolic panel; Future  -     TSH; Future  -     T4, free; Future  -     Pregnancy, Urine - Urine, Clean Catch; Future  -     Cancel: Lab Appointment Request; Future  -     Cancel: Clinic Appointment Request; Future  -     Cancel: Infusion Appointment Request 3; Future  -     ondansetron (ZOFRAN) 8 MG tablet; Take 1 tablet by mouth 3 (Three) Times a Day As Needed for Nausea or Vomiting.  Dispense: 30 tablet; Refill: 5    2. GURINDER (acute kidney injury)  -     Ambulatory Referral to Nephrology    3. Anemia, unspecified type    4. Hereditary hemochromatosis    Other orders  -     sodium chloride 0.9 % infusion  -     Pembrolizumab (KEYTRUDA) 200 mg in sodium chloride 0.9 % 58 mL chemo IVPB          Stage III left RCC  Left radical nephrectomy 12/6/24: Positive margin renal vein.  R1 dissection.  Grade 4.  0 of 17 lymph nodes involved.  Pathologic pT3b N0.  Will need to get baseline CT chest.  Since patient has recovered from surgery.  Category 1 recommendation for adjuvant pembrolizumab every 3 weeks for 1 year discussed with patient.  Risk, benefit, side effect profile discussed in detail.  Will attempt to start soon as possible.  May need port depending on venous access as we go.  As needed antiemetics provided.  Will see patient back for cycle 2.     Anemia  Could potentially be related to RCC that has now been resected.  Will need to follow this closely.  Holding iron supplementation due to elevated ferritin and combined heterozygous hemochromatosis mutations positive.     Hemochromatosis  Patient with elevated ferritin to 600.  This certainly could be related to her chronic disease  as well as renal cell carcinoma.  Hemochromatosis testing did not detect heterozygous mutations in 2 different mutations.  This typically does not lead to clinically relevant hemochromatosis.  Further patient has low iron saturation which is not consistent with iron overload as this would be elevated. Will need to follow ferritin levels closely.  No phlebotomy of course for now. Will also avoid iron supplementation for this time.  Patient does have elevated LFTs.  We may need to consider MRI of the liver. GI eval planned.     CKD  Patient now with right kidney only.  Will refer to nephrology for follow-up.    Folate def  Mild. Agree with daily supplemenation. B12 normal       Patient Follow Up: C2 keytruda    I spent 45 minutes caring for Katherine on this date of service. This time includes time spent by me in the following activities:preparing for the visit, reviewing tests, obtaining and/or reviewing a separately obtained history, performing a medically appropriate examination and/or evaluation , counseling and educating the patient/family/caregiver, ordering medications, tests, or procedures, documenting information in the medical record, and independently interpreting results and communicating that information with the patient/family/caregiver    Patient was given instructions and counseling regarding her condition or for health maintenance advice. Please see specific information pulled into the AVS if appropriate.

## 2025-02-05 NOTE — PROGRESS NOTES
CHEMOTHERAPY PREPARATION    Katherine Rodriguez  [id number]  1980    Chief Complaint:   Chemotherapy Education    History of present illness:  Katherine Rodriguez is a 44 y.o. year old female who is here today for chemotherapy preparation and needs assessment.  The patient has been diagnosed with Renal Cell Cancer- Left - Stage III and is scheduled to begin treatment with pembrolizumab every 21 days for 24 cycles.     Oncology History:    Oncology/Hematology History   Renal cell carcinoma of left kidney   2/4/2025 Initial Diagnosis    Renal cell carcinoma of left kidney     2/4/2025 -  Chemotherapy    OP Pembrolizumab 200 mg     2/4/2025 Cancer Staged    Staging form: Kidney, AJCC 8th Edition  - Pathologic: Stage III (pT3b, pN0, cM0) - Signed by Guy Layne MD on 2/4/2025         The current medication list and allergy list were reviewed and reconciled.     Past Medical History, Past Surgical History, Social History, Family History have been reviewed and are without significant changes except as mentioned.    Review of Systems:  Review of Systems    Physical Exam:  Physical Exam  Vitals and nursing note reviewed.   Constitutional:       Appearance: Normal appearance.   HENT:      Head: Normocephalic.   Cardiovascular:      Rate and Rhythm: Normal rate.   Pulmonary:      Effort: Pulmonary effort is normal.   Neurological:      Mental Status: She is alert.   Psychiatric:         Mood and Affect: Mood normal.         Behavior: Behavior normal.         Thought Content: Thought content normal.         Judgment: Judgment normal.        Vitals:    02/07/25 1448   BP: 152/96   Pulse: 92   Resp: 18   Temp: 98.2 °F (36.8 °C)   SpO2: 100%     Vitals:    02/07/25 1448   PainSc: 0-No pain          ECOG: {findings; ecog performance status:41780        NEEDS ASSESSMENTS    VAD Assessment  The patient and I discussed planned intravenous chemotherapy as well as other IV treatments that are often needed throughout the  course of treatment. These may include, but are not limited to blood transfusions, antibiotics, and IV hydration. The vasculature does appear to be adequate for multiple peripheral IVs throughout their treatment course. Discussed risks and benefits of VADs. The patient would not like to pursue Port-A-Cath insertion prior to initiation of treatment.     Palliative Care  The patient and I discussed palliative care services. Palliative care is not the same as Hospice care. This is specialized medical care for people living with serious illness with the goal of improving quality of life for the patient and their family. [facility] has partnered with [facility] [place] to offer our patients outpatient palliative care early along with their treatment to assist in coordination of care, symptom management, pain management, and medical decision making.  Oncology criteria for palliative care referral is not met at this time. The patient is not interested in a palliative care consultation.     Additional Referral needs  none    CHEMOTHERAPY EDUCATION    Booklets Given: Chemotherapy and You [x]  Eating Hints [x]    Sexuality/Fertility Books []      Chemotherapy/Biotherapy Education Sheets: (list all that apply)  nausea management, acid reflux management, diarrhea management, Cancer resourse contacts information, skin and mouth care, and vaccination information     Education Materials attached to AVS:  Chemotherapy; Preparing for Chemotherapy Adult; Precautions When Utilizing Cytotoxic Medicine                                                                                                                                                                Chemotherapy Regimen:   Treatment Plans       Name Type Plan Dates Plan Provider         Active    OP Pembrolizumab 200 mg ONCOLOGY TREATMENT 2/3/2025 - Present Guy Layne MD                      TOPICS EDUCATION PROVIDED COMMENTS   ANEMIA:  role of RBC, cause, s/s, ways to manage,  role of transfusion [x]    THROMBOCYTOPENIA:  role of platelet, cause, s/s, ways to prevent bleeding, things to avoid, when to seek help [x]    NEUTROPENIA:  role of WBC, cause, infection precautions, s/s of infection, when to call MD [x]    NUTRITION & APPETITE CHANGES:  importance of maintaining healthy diet & weight, ways to manage to improve intake, dietary consult, exercise regimen [x]    DIARRHEA:  causes, s/s of dehydration, ways to manage, dietary changes, when to call MD [x]    CONSTIPATION:  causes, ways to manage, dietary changes, when to call MD [x]    NAUSEA & VOMITING:  cause, use of antiemetics, dietary changes, when to call MD [x]    MOUTH SORES:  causes, oral care, ways to manage [x]    ALOPECIA:  cause, ways to manage, resources [x]    INFERTILITY & SEXUALITY:  causes, fertility preservation options, sexuality changes, ways to manage, importance of birth control []    NERVOUS SYSTEM CHANGES:  causes, s/s, neuropathies, cognitive changes, ways to manage [x]    PAIN:  causes, ways to manage [x]    SKIN & NAIL CHANGES:  cause, s/s, ways to manage [x]    ORGAN TOXICITIES:  cause, s/s, need for diagnostic tests, labs, when to notify MD [x]    SURVIVORSHIP:  distress, distress assessment, secondary malignancies, early/late effects, follow-up, social issues, social support []    HOME CARE:  use of spill kits, storing of PO chemo, how to manage bodily fluids [x]    MISCELLANEOUS:  drug interactions, administration, vesicant, et [x]        Assessment and Plan:    Diagnoses and all orders for this visit:    1. Renal cell carcinoma of left kidney (Primary)    2. Tobacco use  Comments:  D/C use    3. Encounter for health education  Comments:  Immunotherapy Education          The patient and I have reviewed their cancer diagnosis and scheduled treatment plan. Needs assessment was completed including genetics, psychosocial needs, barriers to care, VAD evaluation, advanced care planning, and palliative care  services. Referrals have been ordered as appropriate based upon our evaluation and patient desires.     Chemotherapy teaching was also completed today as documented above. Adequate time was given to answer all questions to her satisfaction. Patient and family are aware of their care team members and contact information if they have questions or problems throughout the treatment course. The patient is adequately prepared to begin treatment as scheduled.     Reviewed with patient education regarding zofran prescriptions sent to pharmacy.       I advised the patient that they can take Tylenol or ibuprofen as needed for aches/pains related to cancer/treatment.  I also advised that they can use Senokot or MiraLAX as needed for constipation or Imodium as needed for diarrhea.       I reviewed with the patient the care team members. I also reviewed the option of the urgent care clinic through our oncology office for evaluation and management of symptoms related to treatment.    I spent 60 minutes caring for Katherine on this date of service. This time includes time spent by me in the following activities: preparing for the visit, reviewing tests, obtaining and/or reviewing a separately obtained history, performing a medically appropriate examination and/or evaluation, counseling and educating the patient/family/caregiver, ordering medications, tests, or procedures, documenting information in the medical record, independently interpreting results and communicating that information with the patient/family/caregiver, and care coordination.

## 2025-02-06 LAB — BACTERIA SPEC AEROBE CULT: NO GROWTH

## 2025-02-07 ENCOUNTER — DOCUMENTATION (OUTPATIENT)
Dept: PHARMACY | Facility: HOSPITAL | Age: 45
End: 2025-02-07
Payer: COMMERCIAL

## 2025-02-07 ENCOUNTER — OFFICE VISIT (OUTPATIENT)
Dept: ONCOLOGY | Facility: HOSPITAL | Age: 45
End: 2025-02-07
Payer: COMMERCIAL

## 2025-02-07 VITALS
BODY MASS INDEX: 29.06 KG/M2 | DIASTOLIC BLOOD PRESSURE: 96 MMHG | OXYGEN SATURATION: 100 % | SYSTOLIC BLOOD PRESSURE: 152 MMHG | TEMPERATURE: 98.2 F | WEIGHT: 170.2 LBS | RESPIRATION RATE: 18 BRPM | HEIGHT: 64 IN | HEART RATE: 92 BPM

## 2025-02-07 DIAGNOSIS — Z72.0 TOBACCO USE: ICD-10-CM

## 2025-02-07 DIAGNOSIS — C64.2 RENAL CELL CARCINOMA OF LEFT KIDNEY: Primary | ICD-10-CM

## 2025-02-07 DIAGNOSIS — Z71.9 ENCOUNTER FOR HEALTH EDUCATION: ICD-10-CM

## 2025-02-07 NOTE — PROGRESS NOTES
"PHARMACY C1D1 PRE VISIT ASSESSMENT    Patient will present for C1D1 of pembrolizumab 200 mg Q21D     44 y.o. female  Estimated body surface area is 1.83 meters squared as calculated from the following:    Height as of 2/4/25: 163.8 cm (64.49\").    Weight as of 2/4/25: 76.6 kg (168 lb 14 oz).    Past Medical History:   Diagnosis Date    Anxiety     Cancer 11-    Kidney cancer. Took out my left kidney.    History of transfusion 12-    Had to get blood for surgery and after for my hemoglobin being low.    Hypertension     Multiple gestation 11/30/1999    Normocytic anemia     Trichomonas vaginalis infection 11/08/2024    Varicella     Whwn i was a younger child.    Vitamin D deficiency        Oncology/Hematology History   Renal cell carcinoma of left kidney   2/4/2025 Initial Diagnosis    Renal cell carcinoma of left kidney     2/4/2025 -  Chemotherapy    OP Pembrolizumab 200 mg     2/4/2025 Cancer Staged    Staging form: Kidney, AJCC 8th Edition  - Pathologic: Stage III (pT3b, pN0, cM0) - Signed by Guy Layne MD on 2/4/2025       Relevant Pathology:   12/12/2024 18:10 EST - Auth (Verified)   SPECIMEN:   A Periaortic lymph nodes   B  Right distal ureter   C  Left kidney and tumor thrombus       CLINICAL HISTORY:   Renal cell carcinoma     DIAGNOSIS:     A. PERIAORTIC LYMPH NODES, RESECTION:   - Six lymph noes, negative for metastatic carcinoma (0/6).     B.  RIGHT DISTAL URETER, BIOPSY:   - Negative for malignancy.     C.  LEFT KIDNEY AND TUMOR THROMBUS, RADICAL NEPHRECTOMY:   - RENAL CELL CARCINOMA, CLEAR CELL TYPE. (Please see synoptic report)   - Seventeen lymph nodes, negative for metastatic carcinoma (0/17).   - Renal vein margin positive for carcinoma.   - Adrenal gland, no malignancy identified.     Electronically signed by ABBY CASTRO MD-PAT   Verified: 12/12/24       Signing Location:   [facility], [address] [place] [address]     SYNOPTIC REPORT: "   CASE SUMMARY: (KIDNEY: Nephrectomy)   Standard(s): AJCC-UICC 8   Procedure: Radical nephrectomy   Specimen Laterality: Left   Tumor Focality: Unifocal   Tumor Site: Upper, Lower and Middle   Tumor Size: 17.2 cm at the greatest dimension.   Histologic Type: Clear cell renal cell carcinoma   Histologic Grade (WHO / ISUP):  G4, extreme nuclear pleomorphism and rhabdoid differentiation   Tumor Extent:     -Extends into perinephric tissue (beyond renal capsule)     Report Request   [id number]                                              2024 18:10 EST     ID:   [address] [id number] [place] [address]-   Willie HILARIO DEVON LESLIE   t:   :    [birth year] 44 years            0   Gender   Female   :   MRN:    [id number]                         Account   [id number]                                               :   Admit    2024      17:00 EST           Discharg   :                                           e:   NAYAN Castro MD-URO         NAYAN Bob MD-URO   ng:                                        Pathology Reports       Accession#:          [id number]   Received:            12/10/2024 10:11 EST     SYNOPTIC REPORT:     -Extends into renal sinus     -Extends into pelvicaliceal system     -Extends into renal vein   Histologic Features: Rhabdoid features present   Tumor Necrosis: Present,   Lymphatic and / or Vascular Invasion: Present   Margin Status: Invasive carcinoma present at margin     -Renal vein (tumor is adherent to vein wall at margin)   Regional Lymph Node Status: All regional lymph nodes negative for tumor     - Number of Lymph Nodes with Tumor: 0     - Number of Lymph Nodes Examined: 23   pTNM CLASSIFICATION (AJCC 8th Edition):  [id number]: Tumor extends into the vena cava below the   diaphragm   Potentially Actionable Mutation Present: NO     Relevant Imaging:    No radiology results for the last 30 days.     Current  treatment regimen has insurance authorization approval? YES    Medication treatment plan entered is appropriate per NCCN Guidelines    Pharmacy Follow-up Considerations:   Patient with newly diagnosed RCC. Patient is s/p radical left nephrectomy 12/6/24, surgical pathology above. Plan for adjuvant pembrolizumab. CT chest pending, will follow up. Pharmacy will continue to monitor labs while on treatment and will  patient prior to first infusion on cycle 1 day 1.     Isidra LyD, BCPS  2/7/2025  07:27 EST

## 2025-02-14 ENCOUNTER — HOSPITAL ENCOUNTER (OUTPATIENT)
Dept: CT IMAGING | Facility: HOSPITAL | Age: 45
Discharge: HOME | End: 2025-02-14
Payer: COMMERCIAL

## 2025-02-14 DIAGNOSIS — C64.2 RENAL CELL CARCINOMA OF LEFT KIDNEY: ICD-10-CM

## 2025-02-14 LAB
CREAT BLDA-MCNC: 2.2 MG/DL (ref 0.6–1.3)
EGFRCR SERPLBLD CKD-EPI 2021: 27.7 ML/MIN/1.73

## 2025-02-14 PROCEDURE — 71250 CT THORAX DX C-: CPT

## 2025-02-14 PROCEDURE — 82565 ASSAY OF CREATININE: CPT

## 2025-02-24 ENCOUNTER — HOSPITAL ENCOUNTER (OUTPATIENT)
Dept: ONCOLOGY | Facility: HOSPITAL | Age: 45
Discharge: HOME | End: 2025-02-24
Admitting: INTERNAL MEDICINE
Payer: COMMERCIAL

## 2025-02-24 VITALS
HEIGHT: 63 IN | WEIGHT: 168.7 LBS | OXYGEN SATURATION: 100 % | TEMPERATURE: 97.5 F | DIASTOLIC BLOOD PRESSURE: 92 MMHG | RESPIRATION RATE: 18 BRPM | HEART RATE: 85 BPM | BODY MASS INDEX: 29.89 KG/M2 | SYSTOLIC BLOOD PRESSURE: 173 MMHG

## 2025-02-24 DIAGNOSIS — C64.2 RENAL CELL CARCINOMA OF LEFT KIDNEY: Primary | ICD-10-CM

## 2025-02-24 LAB
ALBUMIN SERPL-MCNC: 4.2 G/DL (ref 3.5–5.2)
ALBUMIN/GLOB SERPL: 1 G/DL
ALP SERPL-CCNC: 121 U/L (ref 39–117)
ALT SERPL W P-5'-P-CCNC: 5 U/L (ref 1–33)
ANION GAP SERPL CALCULATED.3IONS-SCNC: 13.8 MMOL/L (ref 5–15)
AST SERPL-CCNC: 8 U/L (ref 1–32)
B-HCG UR QL: NEGATIVE
BASOPHILS # BLD AUTO: 0.04 10*3/MM3 (ref 0–0.2)
BASOPHILS NFR BLD AUTO: 0.6 % (ref 0–1.5)
BILIRUB SERPL-MCNC: 0.2 MG/DL (ref 0–1.2)
BUN SERPL-MCNC: 29 MG/DL (ref 6–20)
BUN/CREAT SERPL: 12.2 (ref 7–25)
CALCIUM SPEC-SCNC: 9.9 MG/DL (ref 8.6–10.5)
CHLORIDE SERPL-SCNC: 98 MMOL/L (ref 98–107)
CO2 SERPL-SCNC: 26.2 MMOL/L (ref 22–29)
CREAT SERPL-MCNC: 2.38 MG/DL (ref 0.57–1)
DEPRECATED RDW RBC AUTO: 47.6 FL (ref 37–54)
EGFRCR SERPLBLD CKD-EPI 2021: 25.2 ML/MIN/1.73
EOSINOPHIL # BLD AUTO: 0.17 10*3/MM3 (ref 0–0.4)
EOSINOPHIL NFR BLD AUTO: 2.4 % (ref 0.3–6.2)
ERYTHROCYTE [DISTWIDTH] IN BLOOD BY AUTOMATED COUNT: 14.7 % (ref 12.3–15.4)
GLOBULIN UR ELPH-MCNC: 4.2 GM/DL
GLUCOSE SERPL-MCNC: 89 MG/DL (ref 65–99)
HCT VFR BLD AUTO: 30 % (ref 34–46.6)
HGB BLD-MCNC: 9.4 G/DL (ref 12–15.9)
IMM GRANULOCYTES # BLD AUTO: 0.04 10*3/MM3 (ref 0–0.05)
IMM GRANULOCYTES NFR BLD AUTO: 0.6 % (ref 0–0.5)
LYMPHOCYTES # BLD AUTO: 1.36 10*3/MM3 (ref 0.7–3.1)
LYMPHOCYTES NFR BLD AUTO: 19 % (ref 19.6–45.3)
MCH RBC QN AUTO: 27.8 PG (ref 26.6–33)
MCHC RBC AUTO-ENTMCNC: 31.3 G/DL (ref 31.5–35.7)
MCV RBC AUTO: 88.8 FL (ref 79–97)
MONOCYTES # BLD AUTO: 0.3 10*3/MM3 (ref 0.1–0.9)
MONOCYTES NFR BLD AUTO: 4.2 % (ref 5–12)
NEUTROPHILS NFR BLD AUTO: 5.23 10*3/MM3 (ref 1.7–7)
NEUTROPHILS NFR BLD AUTO: 73.2 % (ref 42.7–76)
NRBC BLD AUTO-RTO: 0 /100 WBC (ref 0–0.2)
PLATELET # BLD AUTO: 299 10*3/MM3 (ref 140–450)
PMV BLD AUTO: 9.4 FL (ref 6–12)
POTASSIUM SERPL-SCNC: 3.7 MMOL/L (ref 3.5–5.2)
PROT SERPL-MCNC: 8.4 G/DL (ref 6–8.5)
RBC # BLD AUTO: 3.38 10*6/MM3 (ref 3.77–5.28)
SODIUM SERPL-SCNC: 138 MMOL/L (ref 136–145)
T4 FREE SERPL-MCNC: 1.38 NG/DL (ref 0.92–1.68)
TSH SERPL DL<=0.05 MIU/L-ACNC: 2.28 UIU/ML (ref 0.27–4.2)
WBC NRBC COR # BLD AUTO: 7.14 10*3/MM3 (ref 3.4–10.8)

## 2025-02-24 PROCEDURE — 84439 ASSAY OF FREE THYROXINE: CPT | Performed by: INTERNAL MEDICINE

## 2025-02-24 PROCEDURE — 85025 COMPLETE CBC W/AUTO DIFF WBC: CPT | Performed by: INTERNAL MEDICINE

## 2025-02-24 PROCEDURE — 81025 URINE PREGNANCY TEST: CPT | Performed by: INTERNAL MEDICINE

## 2025-02-24 PROCEDURE — 96413 CHEMO IV INFUSION 1 HR: CPT

## 2025-02-24 PROCEDURE — 84443 ASSAY THYROID STIM HORMONE: CPT | Performed by: INTERNAL MEDICINE

## 2025-02-24 PROCEDURE — 25010000002 PEMBROLIZUMAB 100 MG/4ML SOLUTION 4 ML VIAL: Performed by: INTERNAL MEDICINE

## 2025-02-24 PROCEDURE — 80053 COMPREHEN METABOLIC PANEL: CPT | Performed by: INTERNAL MEDICINE

## 2025-02-24 PROCEDURE — 25810000003 SODIUM CHLORIDE 0.9 % SOLUTION: Performed by: INTERNAL MEDICINE

## 2025-02-24 RX ORDER — SODIUM CHLORIDE 9 MG/ML
20 INJECTION, SOLUTION INTRAVENOUS ONCE
Status: COMPLETED | OUTPATIENT
Start: 2025-02-24 | End: 2025-02-24

## 2025-02-24 RX ADMIN — SODIUM CHLORIDE 200 MG: 9 INJECTION, SOLUTION INTRAVENOUS at 12:09

## 2025-02-24 RX ADMIN — SODIUM CHLORIDE 20 ML/HR: 9 INJECTION, SOLUTION INTRAVENOUS at 11:42

## 2025-02-24 NOTE — NURSING NOTE
Chemotherapy therapy regimen discussed: Keytruda    Consent signed: yes    Oriented to facility: Orientated to lobby, registration, nourishment area, restrooms, treatment area.    Teaching: Reviewed typical treatment day process and visitor policy. Discussed importance of continuing previously prescribed medications unless otherwise directed by . Pt informed of clinic resources and contact information. Chemotherapy regimen and side effects discussed.     Materials Given: Written materials provided from [employer]    Prescriptions and Pharmacy Verified:  All symptom management prescriptions have been reviewed and are at pharmacy. Pt aware.     Upcoming Appointments Reviewed: Pt and friend v/u of all education and information provided and deny further questions or concerns. RN advised pt to call as needed for any questions or concerns that may arise in the future. Pt v/u.

## 2025-03-12 ENCOUNTER — TELEPHONE (OUTPATIENT)
Dept: GASTROENTEROLOGY | Facility: CLINIC | Age: 45
End: 2025-03-12
Payer: COMMERCIAL

## 2025-03-17 ENCOUNTER — HOSPITAL ENCOUNTER (OUTPATIENT)
Dept: ULTRASOUND IMAGING | Facility: HOSPITAL | Age: 45
Discharge: HOME | End: 2025-03-17
Payer: COMMERCIAL

## 2025-03-17 ENCOUNTER — OFFICE VISIT (OUTPATIENT)
Dept: ONCOLOGY | Facility: HOSPITAL | Age: 45
End: 2025-03-17
Payer: COMMERCIAL

## 2025-03-17 ENCOUNTER — HOSPITAL ENCOUNTER (OUTPATIENT)
Dept: ONCOLOGY | Facility: HOSPITAL | Age: 45
Discharge: HOME | End: 2025-03-17
Payer: COMMERCIAL

## 2025-03-17 VITALS
BODY MASS INDEX: 29.41 KG/M2 | TEMPERATURE: 97 F | DIASTOLIC BLOOD PRESSURE: 81 MMHG | HEART RATE: 88 BPM | SYSTOLIC BLOOD PRESSURE: 143 MMHG | RESPIRATION RATE: 18 BRPM | WEIGHT: 166.01 LBS | OXYGEN SATURATION: 100 % | HEIGHT: 63 IN

## 2025-03-17 VITALS
DIASTOLIC BLOOD PRESSURE: 81 MMHG | TEMPERATURE: 97 F | HEIGHT: 63 IN | BODY MASS INDEX: 29.41 KG/M2 | HEART RATE: 88 BPM | SYSTOLIC BLOOD PRESSURE: 143 MMHG | RESPIRATION RATE: 18 BRPM | OXYGEN SATURATION: 100 % | WEIGHT: 166.01 LBS

## 2025-03-17 DIAGNOSIS — Z14.8 CARRIER OF HEMOCHROMATOSIS HFE GENE MUTATION: ICD-10-CM

## 2025-03-17 DIAGNOSIS — N18.30 ANEMIA DUE TO STAGE 3 CHRONIC KIDNEY DISEASE, UNSPECIFIED WHETHER STAGE 3A OR 3B CKD: ICD-10-CM

## 2025-03-17 DIAGNOSIS — C64.2 RENAL CELL CARCINOMA OF LEFT KIDNEY: Primary | ICD-10-CM

## 2025-03-17 DIAGNOSIS — N17.9 AKI (ACUTE KIDNEY INJURY): ICD-10-CM

## 2025-03-17 DIAGNOSIS — D63.1 ANEMIA DUE TO STAGE 3 CHRONIC KIDNEY DISEASE, UNSPECIFIED WHETHER STAGE 3A OR 3B CKD: ICD-10-CM

## 2025-03-17 DIAGNOSIS — C64.2 RENAL CELL CARCINOMA OF LEFT KIDNEY: ICD-10-CM

## 2025-03-17 LAB
ALBUMIN SERPL-MCNC: 4.3 G/DL (ref 3.5–5.2)
ALBUMIN/GLOB SERPL: 1 G/DL
ALP SERPL-CCNC: 116 U/L (ref 39–117)
ALT SERPL W P-5'-P-CCNC: <5 U/L (ref 1–33)
AMORPH URATE CRY URNS QL MICRO: ABNORMAL /HPF
ANION GAP SERPL CALCULATED.3IONS-SCNC: 15.3 MMOL/L (ref 5–15)
AST SERPL-CCNC: 9 U/L (ref 1–32)
BACTERIA UR QL AUTO: ABNORMAL /HPF
BASOPHILS # BLD AUTO: 0.04 10*3/MM3 (ref 0–0.2)
BASOPHILS NFR BLD AUTO: 0.7 % (ref 0–1.5)
BILIRUB SERPL-MCNC: 0.2 MG/DL (ref 0–1.2)
BILIRUB UR QL STRIP: NEGATIVE
BUN SERPL-MCNC: 46 MG/DL (ref 6–20)
BUN/CREAT SERPL: 8.6 (ref 7–25)
CALCIUM SPEC-SCNC: 9.6 MG/DL (ref 8.6–10.5)
CHLORIDE SERPL-SCNC: 100 MMOL/L (ref 98–107)
CLARITY UR: CLEAR
CO2 SERPL-SCNC: 23.7 MMOL/L (ref 22–29)
COLOR UR: YELLOW
CREAT SERPL-MCNC: 5.34 MG/DL (ref 0.57–1)
DEPRECATED RDW RBC AUTO: 50.9 FL (ref 37–54)
EGFRCR SERPLBLD CKD-EPI 2021: 9.6 ML/MIN/1.73
EOSINOPHIL # BLD AUTO: 0.19 10*3/MM3 (ref 0–0.4)
EOSINOPHIL NFR BLD AUTO: 3.3 % (ref 0.3–6.2)
ERYTHROCYTE [DISTWIDTH] IN BLOOD BY AUTOMATED COUNT: 15.1 % (ref 12.3–15.4)
GLOBULIN UR ELPH-MCNC: 4.4 GM/DL
GLUCOSE SERPL-MCNC: 92 MG/DL (ref 65–99)
GLUCOSE UR STRIP-MCNC: NEGATIVE MG/DL
GRAN CASTS URNS QL MICRO: ABNORMAL /LPF
HCT VFR BLD AUTO: 25.8 % (ref 34–46.6)
HGB BLD-MCNC: 8.5 G/DL (ref 12–15.9)
HGB UR QL STRIP.AUTO: ABNORMAL
HYALINE CASTS UR QL AUTO: ABNORMAL /LPF
IMM GRANULOCYTES # BLD AUTO: 0.04 10*3/MM3 (ref 0–0.05)
IMM GRANULOCYTES NFR BLD AUTO: 0.7 % (ref 0–0.5)
KETONES UR QL STRIP: NEGATIVE
LEUKOCYTE ESTERASE UR QL STRIP.AUTO: NEGATIVE
LYMPHOCYTES # BLD AUTO: 1.16 10*3/MM3 (ref 0.7–3.1)
LYMPHOCYTES NFR BLD AUTO: 20.3 % (ref 19.6–45.3)
MCH RBC QN AUTO: 30 PG (ref 26.6–33)
MCHC RBC AUTO-ENTMCNC: 32.9 G/DL (ref 31.5–35.7)
MCV RBC AUTO: 91.2 FL (ref 79–97)
MONOCYTES # BLD AUTO: 0.25 10*3/MM3 (ref 0.1–0.9)
MONOCYTES NFR BLD AUTO: 4.4 % (ref 5–12)
NEUTROPHILS NFR BLD AUTO: 4.04 10*3/MM3 (ref 1.7–7)
NEUTROPHILS NFR BLD AUTO: 70.6 % (ref 42.7–76)
NITRITE UR QL STRIP: NEGATIVE
NRBC BLD AUTO-RTO: 0 /100 WBC (ref 0–0.2)
PH UR STRIP.AUTO: 5.5 [PH] (ref 5–8)
PLATELET # BLD AUTO: 306 10*3/MM3 (ref 140–450)
PMV BLD AUTO: 9.3 FL (ref 6–12)
POTASSIUM SERPL-SCNC: 4 MMOL/L (ref 3.5–5.2)
PROT SERPL-MCNC: 8.7 G/DL (ref 6–8.5)
PROT UR QL STRIP: ABNORMAL
RBC # BLD AUTO: 2.83 10*6/MM3 (ref 3.77–5.28)
RBC # UR STRIP: ABNORMAL /HPF
REF LAB TEST METHOD: ABNORMAL
RENAL EPI CELLS #/AREA URNS HPF: ABNORMAL /HPF
SODIUM SERPL-SCNC: 139 MMOL/L (ref 136–145)
SP GR UR STRIP: 1.01 (ref 1–1.03)
SQUAMOUS #/AREA URNS HPF: ABNORMAL /HPF
UROBILINOGEN UR QL STRIP: ABNORMAL
WBC # UR STRIP: ABNORMAL /HPF
WBC NRBC COR # BLD AUTO: 5.72 10*3/MM3 (ref 3.4–10.8)

## 2025-03-17 PROCEDURE — 76775 US EXAM ABDO BACK WALL LIM: CPT

## 2025-03-17 PROCEDURE — 96413 CHEMO IV INFUSION 1 HR: CPT

## 2025-03-17 PROCEDURE — 80053 COMPREHEN METABOLIC PANEL: CPT | Performed by: INTERNAL MEDICINE

## 2025-03-17 PROCEDURE — 85025 COMPLETE CBC W/AUTO DIFF WBC: CPT | Performed by: INTERNAL MEDICINE

## 2025-03-17 PROCEDURE — 25010000002 PEMBROLIZUMAB 100 MG/4ML SOLUTION 4 ML VIAL: Performed by: INTERNAL MEDICINE

## 2025-03-17 PROCEDURE — 96361 HYDRATE IV INFUSION ADD-ON: CPT

## 2025-03-17 PROCEDURE — 81001 URINALYSIS AUTO W/SCOPE: CPT | Performed by: INTERNAL MEDICINE

## 2025-03-17 PROCEDURE — 25810000003 SODIUM CHLORIDE 0.9 % SOLUTION: Performed by: INTERNAL MEDICINE

## 2025-03-17 RX ORDER — LISINOPRIL 20 MG/1
TABLET ORAL
COMMUNITY
Start: 2025-03-15 | End: 2025-03-21 | Stop reason: HOSPADM

## 2025-03-17 RX ORDER — SODIUM CHLORIDE 9 MG/ML
20 INJECTION, SOLUTION INTRAVENOUS ONCE
Status: CANCELLED | OUTPATIENT
Start: 2025-03-17

## 2025-03-17 RX ORDER — SODIUM CHLORIDE 9 MG/ML
20 INJECTION, SOLUTION INTRAVENOUS ONCE
Status: DISCONTINUED | OUTPATIENT
Start: 2025-03-17 | End: 2025-03-18 | Stop reason: HOSPADM

## 2025-03-17 RX ORDER — AMLODIPINE BESYLATE 10 MG/1
10 TABLET ORAL DAILY
COMMUNITY

## 2025-03-17 RX ADMIN — SODIUM CHLORIDE 1000 ML: 9 INJECTION, SOLUTION INTRAVENOUS at 10:32

## 2025-03-17 RX ADMIN — SODIUM CHLORIDE 200 MG: 9 INJECTION, SOLUTION INTRAVENOUS at 11:34

## 2025-03-17 RX ADMIN — SODIUM CHLORIDE 1000 ML: 9 INJECTION, SOLUTION INTRAVENOUS at 11:34

## 2025-03-17 NOTE — PROGRESS NOTES
Chief Complaint/Reason for Referral:  FOLLOW UP 1     Provider, No Known  Cceily Nicholas APRN    Records Obtained:  Records of the patients history including those obtained from [facility] EMR were reviewed and summarized in detail.    Subjective    History of Present Illness    Katherine Rodriguez 44 y.o. presents to [facility] for Normocytic Anemia    Very pleasant 44-year-old female presents to the hematology department for further evaluation of her normocytic anemia.  Hx of CKD IIIb. No abd pain, she does have heavy menses cycle for many years.  Has had PAP at health dept.     Patient had radical left nephrectomy on 6 December 2024.  This showed clear-cell renal cell carcinoma with 0 of 17 lymph nodes involved.  Renal vein margin positive for carcinoma.  Adrenal gland with no malignancy.  Pathologic pT3b N0.  Grade 4.  Stage III.    Patient has recovered well from surgery.  Denies any bleeding.  Still has stent in place.  Has seen Dr. Deal with urology for follow-up.  Has follow-up planned on the 18th as well.  No acute concerns today.  Agreeable to starting immunotherapy for her RCC.     Started adjuvant pembrolizumab 2/24/2025.     Interval history  Patient presents for follow-up.  She is due for second cycle of pembrolizumab.  Reports she does not drink as well as she should.  She does report she is making urine.  Does not have any volume overload.  Denies any fevers or chills.  Denies any new pain.  Denies any chest pain.  No trouble breathing.  Creatinine wore today. Ultrasound planned per urology upcoming, will move this up. Plan for IV fluids today.      Hematology History  Patient's most recent CBC reveals a white blood cell count of 8.9, hemoglobin of 8.7 hematocrit 26.9, MCV of 88, platelet count of 410.  In March 2024 patient's hemoglobin was 12.6 on October 1 patient's hemoglobin was 9.3.    October 7, 2024: FOBT negative    10/29/24: WBC 8.29, hgb 8.2, plt 391.  Ferritin 606,  iron sat 7%, TIBC 361. LDH and hapto elevated. B12 normal at 598, folate low at 3.4 (supplementation started)  Hemochromatosis testing: c.845G>A (p.New668Rqf) - Detected, heterozygous   c.187C>G (p.Maa48Roe) - Detected, heterozygous   c.193A>T (p.Pce57Imo) - Not Detected   Holding phlebotomy due to low iron sat.         Cancer-related family history includes Breast cancer in her paternal aunt. There is no history of Ovarian cancer, Uterine cancer, or Colon cancer.     Oncology/Hematology History   Renal cell carcinoma of left kidney   2/4/2025 Initial Diagnosis    Renal cell carcinoma of left kidney     2/4/2025 Cancer Staged    Staging form: Kidney, AJCC 8th Edition  - Pathologic: Stage III (pT3b, pN0, cM0) - Signed by Guy Layne MD on 2/4/2025 2/24/2025 -  Chemotherapy    OP Pembrolizumab 200 mg       Review of Systems   Constitutional:  Negative for appetite change, diaphoresis, fatigue, fever, unexpected weight gain and unexpected weight loss.   HENT: Negative.  Negative for hearing loss, mouth sores, sore throat, swollen glands, trouble swallowing and voice change.    Eyes: Negative.  Negative for blurred vision.   Respiratory: Negative.  Negative for cough, shortness of breath and wheezing.    Cardiovascular: Negative.  Negative for chest pain and palpitations.   Gastrointestinal: Negative.  Negative for abdominal pain, blood in stool, constipation, diarrhea, nausea and vomiting.   Endocrine: Negative.  Negative for cold intolerance and heat intolerance.   Genitourinary: Negative.  Negative for difficulty urinating, dysuria, frequency, hematuria and urinary incontinence.   Musculoskeletal: Negative.  Negative for arthralgias, back pain and myalgias.   Skin: Negative.  Negative for rash, skin lesions and wound.   Allergic/Immunologic: Negative.    Neurological: Negative.  Negative for dizziness, seizures, weakness, numbness and headache.   Hematological: Negative.  Does not  bruise/bleed easily.   Psychiatric/Behavioral:  Negative for depressed mood. The patient is nervous/anxious.    All other systems reviewed and are negative.    Current Outpatient Medications on File Prior to Visit   Medication Sig Dispense Refill    buPROPion SR (WELLBUTRIN SR) 150 MG 12 hr tablet Take 1 tablet by mouth 1 (One) Time. Once a day      Cholecalciferol (Vitamin D) 50 MCG (2000 UT) capsule Take 1 capsule by mouth Daily.      Eliquis 5 MG tablet tablet Take 1 tablet by mouth 2 (Two) Times a Day.      ferrous gluconate (FERGON) 240 (27 FE) MG tablet Take 1 tablet by mouth Every Other Day.      folic acid (FOLVITE) 1 MG tablet Take 1 tablet by mouth Daily. 90 tablet 1    hydroCHLOROthiazide 25 MG tablet Take 1 tablet by mouth Daily.      ondansetron (ZOFRAN) 8 MG tablet Take 1 tablet by mouth 3 (Three) Times a Day As Needed for Nausea or Vomiting. 30 tablet 5     No current facility-administered medications on file prior to visit.     No Known Allergies  Past Medical History:   Diagnosis Date    Anxiety     Cancer 2024    Kidney cancer. Took out my left kidney.    History of transfusion 2024    Had to get blood for surgery and after for my hemoglobin being low.    Hypertension     Multiple gestation 1999    Normocytic anemia     Trichomonas vaginalis infection 2024    Varicella     Whwn i was a younger child.    Vitamin D deficiency      Past Surgical History:   Procedure Laterality Date    BLADDER REPAIR W/  SECTION       SECTION  2013    CHOLECYSTECTOMY      LAPAROSCOPIC CHOLECYSTECTOMY      NEPHRECTOMY RADICAL Left 2024    cancer    TONSILLECTOMY      TUBAL ABDOMINAL LIGATION  2021     Social History     Socioeconomic History    Marital status:    Tobacco Use    Smoking status: Every Day     Current packs/day: 1.50     Average packs/day: 1.5 packs/day for 32.2 years (48.3 ttl pk-yrs)     Types: Cigarettes     Start date:  "1/1/1993   Vaping Use    Vaping status: Never Used   Substance and Sexual Activity    Alcohol use: Never    Drug use: Never    Sexual activity: Not Currently     Partners: Male     Birth control/protection: Condom, Tubal ligation     Family History   Problem Relation Age of Onset    Hypertension Father     Diabetes Mother     Diabetes Maternal Grandmother     Breast cancer Paternal Aunt     Ovarian cancer Neg Hx     Uterine cancer Neg Hx     Colon cancer Neg Hx     Deep vein thrombosis Neg Hx     Pulmonary embolism Neg Hx      Immunization History   Administered Date(s) Administered    Fluzone (or Fluarix & Flulaval for VFC) >6mos 09/23/2020    Tdap 10/20/2020     Tobacco Use: High Risk (3/17/2025)    Patient History     Smoking Tobacco Use: Every Day     Smokeless Tobacco Use: Unknown     Passive Exposure: Not on file     Objective     Vitals:    03/17/25 0934   BP: 143/81   Pulse: 88   Resp: 18   Temp: 97 °F (36.1 °C)   TempSrc: Temporal   SpO2: 100%   Weight: 75.3 kg (166 lb 0.1 oz)   Height: 160 cm (62.99\")        Physical Exam  Vitals and nursing note reviewed.   Constitutional:       General: She is not in acute distress.     Appearance: Normal appearance. She is not ill-appearing.   HENT:      Head: Normocephalic.   Eyes:      Conjunctiva/sclera: Conjunctivae normal.   Cardiovascular:      Rate and Rhythm: Normal rate and regular rhythm.      Heart sounds: Normal heart sounds.   Pulmonary:      Effort: Pulmonary effort is normal.      Breath sounds: Normal breath sounds.   Abdominal:      Palpations: There is no hepatomegaly or splenomegaly.   Lymphadenopathy:      Cervical: No cervical adenopathy.      Right cervical: No superficial cervical adenopathy.     Left cervical: No superficial cervical adenopathy.      Upper Body:      Right upper body: No axillary adenopathy.      Left upper body: No axillary adenopathy.   Skin:     Coloration: Skin is not jaundiced.   Neurological:      Mental Status: She is " alert.   Psychiatric:         Mood and Affect: Mood normal.         Behavior: Behavior normal. Behavior is cooperative.         Thought Content: Thought content normal.         Judgment: Judgment normal.     Wt Readings from Last 3 Encounters:   03/17/25 75.3 kg (166 lb 0.1 oz)   03/17/25 75.3 kg (166 lb 0.1 oz)   02/24/25 76.5 kg (168 lb 11.2 oz)      Body mass index is 29.41 kg/m².              ECOG: (0) Fully Active - Able to Carry On All Pre-disease Performance Without Restriction  Fall Risk Assessment was completed, and patient is at low risk for falls.  PHQ-9 Total Score:         The patient is  experiencing fatigue. Fatigue score: 7    PT/OT Functional Screening:   Speech Functional Screening:   Rehab to be ordered:         Result Review :  The following data was reviewed by: Patience Miller MA on 03/17/25:    LABS SCANNED (10/17/2024)  LABS SCANNED (10/07/2024)  LABS SCANNED (09/30/2024)  LABS SCANNED (03/22/2024)    Labs personally reviewed.  Notable for worse creatinine.  Anemia worse.  K normal.  CO2 normal.  LFTs normal           Assessment and Plan:  Diagnoses and all orders for this visit:    1. Renal cell carcinoma of left kidney (Primary)  -     US Renal Limited; Future    2. 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 GURINDER (acute kidney injury)  -     Urinalysis With Microscopic - Urine, Clean Catch; Future  -     US Renal Limited; Future    4. Anemia due to stage 3 chronic kidney disease, unspecified whether stage 3a or 3b CKD    Other orders  -     OK To Treat  -     Cancel: sodium chloride 0.9 % infusion  -     Cancel: Pembrolizumab (KEYTRUDA) 200 mg in sodium chloride 0.9 % 58 mL chemo IVPB  -     Cancel: sodium chloride 0.9 % bolus 1,000 mL            Stage III left RCC  Left radical nephrectomy 12/6/24: Positive margin renal vein.  R1 dissection.  Grade 4.  0 of 17 lymph nodes involved.  Pathologic pT3b N0.  Will need to get baseline CT chest.  Since patient has recovered from surgery.   Category 1 recommendation for adjuvant pembrolizumab every 3 weeks for 1 year discussed with patient. She started pembrolizumab 2/24/25.     3/17/25: C2D1 adjuvant pembrolizumab 200 mg every 3 weeks. Labs appropriate to proceed    RTC C3    Continue to monitor for severe toxicities with frequent labs and office visits.    GURINDER on CKD  Do expect some CKD from 1 kidney.  However patient with worsening of her creatinine to 5.3 as of 3/17/2025.  Fortunately potassium normal.  She is also not acidotic.  She is making urine.  Does not have volume overload.  Will plan for 2 L of IV fluid today.  Repeat labs later this week and plan for more fluids at that time.  Also get a UA today. Urgent right kidney ultrasound ordered. Unlikely from pembrolizumab as it is occurred after only 1 cycle. Did  patient that if she stops making urine slight, compartment related, develops chest or abdominal pain, to present to the ER.      Anemia  Could potentially be related to RCC that has now been resected.  Will need to follow this closely.  Holding iron supplementation due to elevated ferritin and combined heterozygous hemochromatosis mutations positive.  Worse today due to likely worsening kidney function.  Will repeat CBC later this week.    Hemochromatosis  Patient with elevated ferritin to 600.  This certainly could be related to her chronic disease as well as renal cell carcinoma.  Hemochromatosis testing did detect heterozygous mutations in 2 different mutations.  This typically does not lead to clinically relevant hemochromatosis.  Further patient has low iron saturation which is not consistent with iron overload as this would be elevated. Will need to follow ferritin levels closely.  No phlebotomy for now. Will also avoid iron supplementation for this time.  Patient does not have elevated LFTs.  We may need to consider MRI of the liver if ferritin increases further.     CKD  Patient now with right kidney only.  Will refer to  nephrology for follow-up.    Folate def  Mild. Agree with daily folate supplementation. B12 normal.       Patient Follow Up: C3 keytruda    I spent 30 minutes caring for Katherine on this date of service. This time includes time spent by me in the following activities:preparing for the visit, reviewing tests, obtaining and/or reviewing a separately obtained history, performing a medically appropriate examination and/or evaluation , counseling and educating the patient/family/caregiver, ordering medications, tests, or procedures, documenting information in the medical record, and independently interpreting results and communicating that information with the patient/family/caregiver    Patient was given instructions and counseling regarding her condition or for health maintenance advice. Please see specific information pulled into the AVS if appropriate.

## 2025-03-19 ENCOUNTER — HOSPITAL ENCOUNTER (OUTPATIENT)
Dept: ONCOLOGY | Facility: HOSPITAL | Age: 45
Discharge: HOME | End: 2025-03-19
Admitting: INTERNAL MEDICINE
Payer: COMMERCIAL

## 2025-03-19 ENCOUNTER — HOSPITAL ENCOUNTER (INPATIENT)
Facility: HOSPITAL | Age: 45
LOS: 2 days | Discharge: HOME | DRG: 660 | End: 2025-03-21
Attending: EMERGENCY MEDICINE | Admitting: INTERNAL MEDICINE
Payer: COMMERCIAL

## 2025-03-19 VITALS
OXYGEN SATURATION: 100 % | DIASTOLIC BLOOD PRESSURE: 87 MMHG | SYSTOLIC BLOOD PRESSURE: 178 MMHG | BODY MASS INDEX: 30.31 KG/M2 | HEIGHT: 63 IN | RESPIRATION RATE: 18 BRPM | WEIGHT: 171.08 LBS | TEMPERATURE: 97.9 F | HEART RATE: 104 BPM

## 2025-03-19 DIAGNOSIS — N13.30 HYDRONEPHROSIS, UNSPECIFIED HYDRONEPHROSIS TYPE: ICD-10-CM

## 2025-03-19 DIAGNOSIS — Z90.5 SOLITARY KIDNEY, ACQUIRED: ICD-10-CM

## 2025-03-19 DIAGNOSIS — C64.2 RENAL CELL CARCINOMA OF LEFT KIDNEY: ICD-10-CM

## 2025-03-19 DIAGNOSIS — N17.9 AKI (ACUTE KIDNEY INJURY): Primary | ICD-10-CM

## 2025-03-19 LAB
ALBUMIN SERPL-MCNC: 4.1 G/DL (ref 3.5–5.2)
ALBUMIN/GLOB SERPL: 0.9 G/DL
ALP SERPL-CCNC: 115 U/L (ref 39–117)
ALT SERPL W P-5'-P-CCNC: <5 U/L (ref 1–33)
ANION GAP SERPL CALCULATED.3IONS-SCNC: 14.5 MMOL/L (ref 5–15)
AST SERPL-CCNC: 8 U/L (ref 1–32)
BACTERIA UR QL AUTO: ABNORMAL /HPF
BASOPHILS # BLD AUTO: 0.04 10*3/MM3 (ref 0–0.2)
BASOPHILS NFR BLD AUTO: 0.6 % (ref 0–1.5)
BILIRUB SERPL-MCNC: 0.2 MG/DL (ref 0–1.2)
BILIRUB UR QL STRIP: NEGATIVE
BUN SERPL-MCNC: 38 MG/DL (ref 6–20)
BUN/CREAT SERPL: 7.8 (ref 7–25)
CALCIUM SPEC-SCNC: 10.1 MG/DL (ref 8.6–10.5)
CHLORIDE SERPL-SCNC: 100 MMOL/L (ref 98–107)
CLARITY UR: CLEAR
CO2 SERPL-SCNC: 22.5 MMOL/L (ref 22–29)
COLOR UR: YELLOW
CREAT SERPL-MCNC: 4.9 MG/DL (ref 0.57–1)
DEPRECATED RDW RBC AUTO: 49 FL (ref 37–54)
EGFRCR SERPLBLD CKD-EPI 2021: 10.6 ML/MIN/1.73
EOSINOPHIL # BLD AUTO: 0.18 10*3/MM3 (ref 0–0.4)
EOSINOPHIL NFR BLD AUTO: 2.7 % (ref 0.3–6.2)
ERYTHROCYTE [DISTWIDTH] IN BLOOD BY AUTOMATED COUNT: 14.9 % (ref 12.3–15.4)
GLOBULIN UR ELPH-MCNC: 4.4 GM/DL
GLUCOSE SERPL-MCNC: 94 MG/DL (ref 65–99)
GLUCOSE UR STRIP-MCNC: NEGATIVE MG/DL
HCT VFR BLD AUTO: 22.7 % (ref 34–46.6)
HGB BLD-MCNC: 7.4 G/DL (ref 12–15.9)
HGB UR QL STRIP.AUTO: ABNORMAL
HYALINE CASTS UR QL AUTO: ABNORMAL /LPF
IMM GRANULOCYTES # BLD AUTO: 0.02 10*3/MM3 (ref 0–0.05)
IMM GRANULOCYTES NFR BLD AUTO: 0.3 % (ref 0–0.5)
KETONES UR QL STRIP: NEGATIVE
LEUKOCYTE ESTERASE UR QL STRIP.AUTO: NEGATIVE
LYMPHOCYTES # BLD AUTO: 1.17 10*3/MM3 (ref 0.7–3.1)
LYMPHOCYTES NFR BLD AUTO: 17.8 % (ref 19.6–45.3)
MCH RBC QN AUTO: 29.1 PG (ref 26.6–33)
MCHC RBC AUTO-ENTMCNC: 32.6 G/DL (ref 31.5–35.7)
MCV RBC AUTO: 89.4 FL (ref 79–97)
MONOCYTES # BLD AUTO: 0.35 10*3/MM3 (ref 0.1–0.9)
MONOCYTES NFR BLD AUTO: 5.3 % (ref 5–12)
NEUTROPHILS NFR BLD AUTO: 4.81 10*3/MM3 (ref 1.7–7)
NEUTROPHILS NFR BLD AUTO: 73.3 % (ref 42.7–76)
NITRITE UR QL STRIP: NEGATIVE
NRBC BLD AUTO-RTO: 0 /100 WBC (ref 0–0.2)
PH UR STRIP.AUTO: 6.5 [PH] (ref 5–8)
PLATELET # BLD AUTO: 277 10*3/MM3 (ref 140–450)
PMV BLD AUTO: 9.5 FL (ref 6–12)
POTASSIUM SERPL-SCNC: 4 MMOL/L (ref 3.5–5.2)
PROT SERPL-MCNC: 8.5 G/DL (ref 6–8.5)
PROT UR QL STRIP: ABNORMAL
RBC # BLD AUTO: 2.54 10*6/MM3 (ref 3.77–5.28)
RBC # UR STRIP: ABNORMAL /HPF
REF LAB TEST METHOD: ABNORMAL
SODIUM SERPL-SCNC: 137 MMOL/L (ref 136–145)
SP GR UR STRIP: 1.01 (ref 1–1.03)
SQUAMOUS #/AREA URNS HPF: ABNORMAL /HPF
UROBILINOGEN UR QL STRIP: ABNORMAL
WBC # UR STRIP: ABNORMAL /HPF
WBC NRBC COR # BLD AUTO: 6.57 10*3/MM3 (ref 3.4–10.8)

## 2025-03-19 PROCEDURE — 96360 HYDRATION IV INFUSION INIT: CPT

## 2025-03-19 PROCEDURE — 25810000003 SODIUM CHLORIDE 0.9 % SOLUTION: Performed by: INTERNAL MEDICINE

## 2025-03-19 PROCEDURE — 81001 URINALYSIS AUTO W/SCOPE: CPT | Performed by: REGISTERED NURSE

## 2025-03-19 PROCEDURE — 85025 COMPLETE CBC W/AUTO DIFF WBC: CPT | Performed by: INTERNAL MEDICINE

## 2025-03-19 PROCEDURE — 99222 1ST HOSP IP/OBS MODERATE 55: CPT | Performed by: INTERNAL MEDICINE

## 2025-03-19 PROCEDURE — 80053 COMPREHEN METABOLIC PANEL: CPT | Performed by: INTERNAL MEDICINE

## 2025-03-19 PROCEDURE — 99285 EMERGENCY DEPT VISIT HI MDM: CPT

## 2025-03-19 RX ORDER — BUPROPION HYDROCHLORIDE 150 MG/1
150 TABLET, EXTENDED RELEASE ORAL DAILY
Status: DISCONTINUED | OUTPATIENT
Start: 2025-03-20 | End: 2025-03-21 | Stop reason: HOSPADM

## 2025-03-19 RX ORDER — HEPARIN SODIUM 5000 [USP'U]/ML
5000 INJECTION, SOLUTION INTRAVENOUS; SUBCUTANEOUS EVERY 8 HOURS SCHEDULED
Status: DISCONTINUED | OUTPATIENT
Start: 2025-03-19 | End: 2025-03-19

## 2025-03-19 RX ORDER — ONDANSETRON 2 MG/ML
4 INJECTION INTRAMUSCULAR; INTRAVENOUS EVERY 6 HOURS PRN
Status: DISCONTINUED | OUTPATIENT
Start: 2025-03-19 | End: 2025-03-21 | Stop reason: HOSPADM

## 2025-03-19 RX ORDER — SODIUM CHLORIDE 9 MG/ML
40 INJECTION, SOLUTION INTRAVENOUS AS NEEDED
Status: DISCONTINUED | OUTPATIENT
Start: 2025-03-19 | End: 2025-03-21 | Stop reason: HOSPADM

## 2025-03-19 RX ORDER — AMOXICILLIN 250 MG
2 CAPSULE ORAL 2 TIMES DAILY PRN
Status: DISCONTINUED | OUTPATIENT
Start: 2025-03-19 | End: 2025-03-21 | Stop reason: HOSPADM

## 2025-03-19 RX ORDER — POLYETHYLENE GLYCOL 3350 17 G/17G
17 POWDER, FOR SOLUTION ORAL DAILY PRN
Status: DISCONTINUED | OUTPATIENT
Start: 2025-03-19 | End: 2025-03-21 | Stop reason: HOSPADM

## 2025-03-19 RX ORDER — BISACODYL 10 MG
10 SUPPOSITORY, RECTAL RECTAL DAILY PRN
Status: DISCONTINUED | OUTPATIENT
Start: 2025-03-19 | End: 2025-03-21 | Stop reason: HOSPADM

## 2025-03-19 RX ORDER — SODIUM CHLORIDE 0.9 % (FLUSH) 0.9 %
10 SYRINGE (ML) INJECTION AS NEEDED
Status: DISCONTINUED | OUTPATIENT
Start: 2025-03-19 | End: 2025-03-19

## 2025-03-19 RX ORDER — SODIUM CHLORIDE 0.9 % (FLUSH) 0.9 %
10 SYRINGE (ML) INJECTION EVERY 12 HOURS SCHEDULED
Status: DISCONTINUED | OUTPATIENT
Start: 2025-03-19 | End: 2025-03-21 | Stop reason: HOSPADM

## 2025-03-19 RX ORDER — AMLODIPINE BESYLATE 10 MG/1
10 TABLET ORAL DAILY
Status: DISCONTINUED | OUTPATIENT
Start: 2025-03-20 | End: 2025-03-21 | Stop reason: HOSPADM

## 2025-03-19 RX ORDER — SODIUM CHLORIDE 0.9 % (FLUSH) 0.9 %
10 SYRINGE (ML) INJECTION AS NEEDED
Status: DISCONTINUED | OUTPATIENT
Start: 2025-03-19 | End: 2025-03-21 | Stop reason: HOSPADM

## 2025-03-19 RX ORDER — BISACODYL 5 MG/1
5 TABLET, DELAYED RELEASE ORAL DAILY PRN
Status: DISCONTINUED | OUTPATIENT
Start: 2025-03-19 | End: 2025-03-21 | Stop reason: HOSPADM

## 2025-03-19 RX ADMIN — SODIUM CHLORIDE 1000 ML: 0.9 INJECTION, SOLUTION INTRAVENOUS at 13:33

## 2025-03-19 NOTE — ED PROVIDER NOTES
Time: 4:26 PM EDT  Date of encounter:  3/19/2025  Independent Historian/Clinical History and Information was obtained by:   Patient    History is limited by: N/A    Chief Complaint   Patient presents with    Abnormal Lab         History of Present Illness:  Patient is a 44 y.o. year old female who presents to the emergency department for evaluation of abnormal lab results with oncologist today.  Patient has kidney cancer in her left kidney has been removed.  During today's blood draw she was found to be anemic with a hemoglobin of 7.5 and also abnormal kidney function results.  Patient has had some lower leg swelling for which she has been taken water pills.  The patient denies any complaints.  Weekly she denies chest pain, shortness of breath or, or dizziness.  RICARDO Salinas        Patient states she had left nephrectomy December 2024.  She was seen for a transfusion on Monday with heme-onc and states that her kidney function was low so they gave her 2 L of saline.  He was there again today and was given a liter of normal saline but was told that her lab work came back and her kidney function is worsened.  She states that she was taking hydrochlorothiazide with last dose last Thursday but then started having lower leg swelling bilaterally so she was told to start taking it this past Monday.  Patient states she takes it once a day mostly at night due to the lower extremity edema.  Patient denies nausea, vomiting, diarrhea, lightheadedness, weakness, difficulty urinating, hematuria, dysuria dizziness.      Patient Care Team  Primary Care Provider: Cecily Nicholas APRN    Past Medical History:     No Known Allergies  Past Medical History:   Diagnosis Date    Anxiety     Cancer 11-    Kidney cancer. Took out my left kidney.    History of transfusion 12-    Had to get blood for surgery and after for my hemoglobin being low.    Hypertension     Multiple gestation 11/30/1999    Normocytic anemia      Trichomonas vaginalis infection 2024    Varicella     Whwn i was a younger child.    Vitamin D deficiency      Past Surgical History:   Procedure Laterality Date    BLADDER REPAIR W/  SECTION       SECTION  2013    CHOLECYSTECTOMY      LAPAROSCOPIC CHOLECYSTECTOMY      NEPHRECTOMY RADICAL Left 2024    cancer    TONSILLECTOMY      TUBAL ABDOMINAL LIGATION  2021     Family History   Problem Relation Age of Onset    Hypertension Father     Diabetes Mother     Diabetes Maternal Grandmother     Breast cancer Paternal Aunt     Ovarian cancer Neg Hx     Uterine cancer Neg Hx     Colon cancer Neg Hx     Deep vein thrombosis Neg Hx     Pulmonary embolism Neg Hx        Home Medications:  Prior to Admission medications    Medication Sig Start Date End Date Taking? Authorizing Provider   amLODIPine (NORVASC) 10 MG tablet Take 1 tablet by mouth Daily.    Theo Foster MD   buPROPion SR (WELLBUTRIN SR) 150 MG 12 hr tablet Take 1 tablet by mouth 1 (One) Time. Once a day    Theo Foster MD   Cholecalciferol (Vitamin D) 50 MCG (2000 UT) capsule Take 1 capsule by mouth Daily.    Theo Foster MD   Eliquis 5 MG tablet tablet Take 1 tablet by mouth 2 (Two) Times a Day. 12/15/24   Theo Foster MD   ferrous gluconate (FERGON) 240 (27 FE) MG tablet Take 1 tablet by mouth Every Other Day.    Theo Foster MD   folic acid (FOLVITE) 1 MG tablet Take 1 tablet by mouth Daily. 10/30/24   Xavier Molina PA-C   hydroCHLOROthiazide 25 MG tablet Take 1 tablet by mouth Daily.  Patient not taking: Reported on 3/17/2025    Theo Foster MD   lisinopril (PRINIVIL,ZESTRIL) 20 MG tablet  3/15/25   Theo Foster MD   ondansetron (ZOFRAN) 8 MG tablet Take 1 tablet by mouth 3 (Three) Times a Day As Needed for Nausea or Vomiting. 25   Guy Layne MD        Social History:   Social History     Tobacco Use    Smoking status: Every  "Day     Current packs/day: 1.50     Average packs/day: 1.5 packs/day for 32.2 years (48.3 ttl pk-yrs)     Types: Cigarettes     Start date: 1/1/1993   Vaping Use    Vaping status: Never Used   Substance Use Topics    Alcohol use: Never    Drug use: Never         Review of Systems:  Review of Systems   Respiratory:  Negative for shortness of breath.    Cardiovascular:  Positive for leg swelling. Negative for chest pain.   Gastrointestinal:  Negative for diarrhea, nausea and vomiting.   Genitourinary:  Negative for difficulty urinating, dysuria, flank pain and hematuria.   Neurological:  Negative for dizziness, weakness and light-headedness.        Physical Exam:  /96   Pulse 90   Temp 98 °F (36.7 °C) (Oral)   Resp 16   Ht 160 cm (63\")   Wt 77.6 kg (171 lb)   LMP 03/12/2025   SpO2 100%   BMI 30.29 kg/m²         Physical Exam  Vitals and nursing note reviewed.   Constitutional:       Appearance: Normal appearance.   HENT:      Head: Normocephalic and atraumatic.      Nose: Nose normal.      Mouth/Throat:      Mouth: Mucous membranes are moist.   Eyes:      Extraocular Movements: Extraocular movements intact.      Conjunctiva/sclera: Conjunctivae normal.      Pupils: Pupils are equal, round, and reactive to light.   Cardiovascular:      Rate and Rhythm: Normal rate and regular rhythm.      Heart sounds: Normal heart sounds.   Pulmonary:      Effort: Pulmonary effort is normal.      Breath sounds: Normal breath sounds.   Abdominal:      General: Abdomen is flat.      Palpations: Abdomen is soft.      Tenderness: There is no abdominal tenderness. There is no right CVA tenderness, left CVA tenderness or guarding.   Musculoskeletal:         General: Normal range of motion.      Cervical back: Normal range of motion and neck supple.      Right lower leg: Edema present.      Left lower leg: Edema present.      Comments: Mild bilateral lower extremity edema   Skin:     General: Skin is warm and dry. "   Neurological:      General: No focal deficit present.      Mental Status: She is alert and oriented to person, place, and time.   Psychiatric:         Mood and Affect: Mood normal.         Behavior: Behavior normal.                     Medical Decision Making:      Comorbidities that affect care:    Renal cancer, Cancer, Hypertension    External Notes reviewed:    Previous Clinic Note: Office visit with oncology 3/19/2025 for infusion in 3/17/2025 for renal cell carcinoma of left kidney and infusion, Previous Radiological Studies: ReSound renal limited 3/17/2025 showing mild to moderate right-sided hydronephrosis, and Previous Labs: CMP from 3/19/2025 showing BUN 38, creatinine 4.9 and a GFR of 10.6.  CMP from 2 days ago showing BUN 46 with a creatinine 5.3 and a GFR of 96. CBC from 3/19/2025 showing hemoglobin is 7.4.  CBC from 3/17/2025 showing hemoglobin of 8.5       The following orders were placed and all results were independently analyzed by me:  Orders Placed This Encounter   Procedures    Urinalysis With Microscopic If Indicated (No Culture) - Urine, Clean Catch    Urinalysis, Microscopic Only - Urine, Clean Catch    Code Status and Medical Interventions: CPR (Attempt to Resuscitate); Full Support    Inpatient Nephrology Consult    Inpatient Hospitalist Consult    Inpatient Admission       Medications Given in the Emergency Department:  Medications - No data to display       ED Course:    The patient was initially evaluated in the triage area where orders were placed. The patient was later dispositioned by Ozzy Gale PA-C.      The patient was advised to stay for completion of workup which includes but is not limited to communication of labs and radiological results, reassessment and plan. The patient was advised that leaving prior to disposition by a provider could result in critical findings that are not communicated to the patient.     ED Course as of 03/19/25 1948   Wed Mar 19, 2025   1921  Consulted with Yobani EdmondsNP with nephrology.  I have discussed patient's lab work.  States Dr. Rivers will follow-up with patient and hold off on IV fluids as an outpatient [AJ]      ED Course User Index  [AJ] Ozzy Gale, ANGI       Labs:    Lab Results (last 24 hours)       Procedure Component Value Units Date/Time    CBC and Differential [[id number]]  (Abnormal) Collected: 03/19/25 1327    Specimen: Blood Updated: 03/19/25 1355    Narrative:      The following orders were created for panel order CBC and Differential.  Procedure                               Abnormality         Status                     ---------                               -----------         ------                     CBC Auto Differential[[id number]]        Abnormal            Final result                 Please view results for these tests on the individual orders.    Comprehensive metabolic panel [[id number]]  (Abnormal) Collected: 03/19/25 1327    Specimen: Blood Updated: 03/19/25 1411     Glucose 94 mg/dL      BUN 38 mg/dL      Creatinine 4.90 mg/dL      Sodium 137 mmol/L      Potassium 4.0 mmol/L      Chloride 100 mmol/L      CO2 22.5 mmol/L      Calcium 10.1 mg/dL      Total Protein 8.5 g/dL      Albumin 4.1 g/dL      ALT (SGPT) <5 U/L      AST (SGOT) 8 U/L      Alkaline Phosphatase 115 U/L      Total Bilirubin 0.2 mg/dL      Globulin 4.4 gm/dL      A/G Ratio 0.9 g/dL      BUN/Creatinine Ratio 7.8     Anion Gap 14.5 mmol/L      eGFR 10.6 mL/min/1.73     Narrative:      GFR Categories in Chronic Kidney Disease (CKD)      GFR Category          GFR (mL/min/1.73)    Interpretation  G1                     90 or greater         Normal or high (1)  G2                      60-89                Mild decrease (1)  G3a                   45-59                Mild to moderate decrease  G3b                   30-44                Moderate to severe decrease  G4                    15-29                Severe decrease  G5                    14 or  less           Kidney failure          (1)In the absence of evidence of kidney disease, neither GFR category G1 or G2 fulfill the criteria for CKD.    eGFR calculation 2021 CKD-EPI creatinine equation, which does not include race as a factor    CBC Auto Differential [[id number]]  (Abnormal) Collected: 03/19/25 1327    Specimen: Blood Updated: 03/19/25 1355     WBC 6.57 10*3/mm3      RBC 2.54 10*6/mm3      Hemoglobin 7.4 g/dL      Hematocrit 22.7 %      MCV 89.4 fL      MCH 29.1 pg      MCHC 32.6 g/dL      RDW 14.9 %      RDW-SD 49.0 fl      MPV 9.5 fL      Platelets 277 10*3/mm3      Neutrophil % 73.3 %      Lymphocyte % 17.8 %      Monocyte % 5.3 %      Eosinophil % 2.7 %      Basophil % 0.6 %      Immature Grans % 0.3 %      Neutrophils, Absolute 4.81 10*3/mm3      Lymphocytes, Absolute 1.17 10*3/mm3      Monocytes, Absolute 0.35 10*3/mm3      Eosinophils, Absolute 0.18 10*3/mm3      Basophils, Absolute 0.04 10*3/mm3      Immature Grans, Absolute 0.02 10*3/mm3      nRBC 0.0 /100 WBC     Urinalysis With Microscopic If Indicated (No Culture) - Urine, Clean Catch [[id number]]  (Abnormal) Collected: 03/19/25 1659    Specimen: Urine, Clean Catch Updated: 03/19/25 1718     Color, UA Yellow     Appearance, UA Clear     pH, UA 6.5     Specific Gravity, UA 1.008     Glucose, UA Negative     Ketones, UA Negative     Bilirubin, UA Negative     Blood, UA Trace     Protein, UA 30 mg/dL (1+)     Leuk Esterase, UA Negative     Nitrite, UA Negative     Urobilinogen, UA 0.2 E.U./dL    Urinalysis, Microscopic Only - Urine, Clean Catch [[id number]]  (Abnormal) Collected: 03/19/25 1659    Specimen: Urine, Clean Catch Updated: 03/19/25 1718     RBC, UA 0-2 /HPF      WBC, UA 0-2 /HPF      Bacteria, UA Trace /HPF      Squamous Epithelial Cells, UA 3-6 /HPF      Hyaline Casts, UA 0-2 /LPF      Methodology Automated Microscopy             Imaging:    No Radiology Exams Resulted Within Past 24 Hours      Differential Diagnosis and  Discussion:      Metabolic: Differential diagnosis includes but is not limited to hypertension, hyperglycemia, hyperkalemia, hypocalcemia, metabolic acidosis, hypokalemia, hypoglycemia, malnutrition, hypothyroidism, hyperthyroidism, and adrenal insufficiency.     PROCEDURES:    Labs were collected in the emergency department and all labs were reviewed and interpreted by me.    No orders to display        Procedures    MDM                   Patient Care Considerations:    CT ABDOMEN AND PELVIS: I considered ordering a CT scan of the abdomen and pelvis however abdomen soft and nontender.  No CVA tenderness ANTIBIOTICS: I considered prescribing antibiotics as an outpatient however no bacterial focus of infection was found.      Consultants/Shared Management Plan:    Hospitalist: I have discussed the case with Dr. Le who agrees to accept the patient for admission.  Consultant: I have discussed the case with Yobani Edmonds, NP with nephrology who states he does not recommend any additional orders or IV fluids.  States Dr. Rivers will see the patient tomorrow    Social Determinants of Health:    Patient is independent, reliable, and has access to care.       Disposition and Care Coordination:    Admit:   Through independent evaluation of the patient's history, physical, and imperical data, the patient meets criteria for inpatient admission to the hospital.        Final diagnoses:   GURINDER (acute kidney injury)        ED Disposition       ED Disposition   Decision to Admit    Condition   --    Comment   Level of Care: Med/Surg [1]   Diagnosis: GURINDER (acute kidney injury) [202817]   Certification: I Certify That Inpatient Hospital Services Are Medically Necessary For Greater Than 2 Midnights                 This medical record created using voice recognition software.             Ozzy Gale PA-C  03/19/25 1948

## 2025-03-20 ENCOUNTER — ANESTHESIA EVENT (OUTPATIENT)
Dept: PERIOP | Facility: HOSPITAL | Age: 45
End: 2025-03-20
Payer: COMMERCIAL

## 2025-03-20 ENCOUNTER — APPOINTMENT (OUTPATIENT)
Dept: CT IMAGING | Facility: HOSPITAL | Age: 45
DRG: 660 | End: 2025-03-20
Payer: COMMERCIAL

## 2025-03-20 ENCOUNTER — APPOINTMENT (OUTPATIENT)
Dept: GENERAL RADIOLOGY | Facility: HOSPITAL | Age: 45
DRG: 660 | End: 2025-03-20
Payer: COMMERCIAL

## 2025-03-20 ENCOUNTER — ANESTHESIA (OUTPATIENT)
Dept: PERIOP | Facility: HOSPITAL | Age: 45
End: 2025-03-20
Payer: COMMERCIAL

## 2025-03-20 PROBLEM — Z90.5 SOLITARY KIDNEY, ACQUIRED: Status: ACTIVE | Noted: 2025-03-20

## 2025-03-20 PROBLEM — N13.39 OTHER HYDRONEPHROSIS: Status: ACTIVE | Noted: 2025-03-20

## 2025-03-20 PROBLEM — N13.30 HYDRONEPHROSIS: Status: ACTIVE | Noted: 2025-03-19

## 2025-03-20 LAB
ALBUMIN SERPL-MCNC: 3.4 G/DL (ref 3.5–5.2)
ALBUMIN SERPL-MCNC: 3.4 G/DL (ref 3.5–5.2)
ALBUMIN/GLOB SERPL: 0.9 G/DL
ALP SERPL-CCNC: 99 U/L (ref 39–117)
ALT SERPL W P-5'-P-CCNC: <5 U/L (ref 1–33)
ANION GAP SERPL CALCULATED.3IONS-SCNC: 12 MMOL/L (ref 5–15)
ANION GAP SERPL CALCULATED.3IONS-SCNC: 12.7 MMOL/L (ref 5–15)
AST SERPL-CCNC: 6 U/L (ref 1–32)
BASOPHILS # BLD AUTO: 0.03 10*3/MM3 (ref 0–0.2)
BASOPHILS NFR BLD AUTO: 0.6 % (ref 0–1.5)
BILIRUB SERPL-MCNC: 0.2 MG/DL (ref 0–1.2)
BUN SERPL-MCNC: 35 MG/DL (ref 6–20)
BUN SERPL-MCNC: 37 MG/DL (ref 6–20)
BUN/CREAT SERPL: 8.4 (ref 7–25)
BUN/CREAT SERPL: 8.9 (ref 7–25)
CALCIUM SPEC-SCNC: 9.1 MG/DL (ref 8.6–10.5)
CALCIUM SPEC-SCNC: 9.2 MG/DL (ref 8.6–10.5)
CHLORIDE SERPL-SCNC: 102 MMOL/L (ref 98–107)
CHLORIDE SERPL-SCNC: 102 MMOL/L (ref 98–107)
CO2 SERPL-SCNC: 20.3 MMOL/L (ref 22–29)
CO2 SERPL-SCNC: 21 MMOL/L (ref 22–29)
CREAT SERPL-MCNC: 4.15 MG/DL (ref 0.57–1)
CREAT SERPL-MCNC: 4.15 MG/DL (ref 0.57–1)
DEPRECATED RDW RBC AUTO: 47.7 FL (ref 37–54)
EGFRCR SERPLBLD CKD-EPI 2021: 12.9 ML/MIN/1.73
EGFRCR SERPLBLD CKD-EPI 2021: 12.9 ML/MIN/1.73
EOSINOPHIL # BLD AUTO: 0.15 10*3/MM3 (ref 0–0.4)
EOSINOPHIL NFR BLD AUTO: 2.9 % (ref 0.3–6.2)
ERYTHROCYTE [DISTWIDTH] IN BLOOD BY AUTOMATED COUNT: 14.8 % (ref 12.3–15.4)
GLOBULIN UR ELPH-MCNC: 3.6 GM/DL
GLUCOSE SERPL-MCNC: 84 MG/DL (ref 65–99)
GLUCOSE SERPL-MCNC: 86 MG/DL (ref 65–99)
HCT VFR BLD AUTO: 23.2 % (ref 34–46.6)
HGB BLD-MCNC: 7.6 G/DL (ref 12–15.9)
IMM GRANULOCYTES # BLD AUTO: 0.02 10*3/MM3 (ref 0–0.05)
IMM GRANULOCYTES NFR BLD AUTO: 0.4 % (ref 0–0.5)
LYMPHOCYTES # BLD AUTO: 1.15 10*3/MM3 (ref 0.7–3.1)
LYMPHOCYTES NFR BLD AUTO: 22.5 % (ref 19.6–45.3)
MCH RBC QN AUTO: 29 PG (ref 26.6–33)
MCHC RBC AUTO-ENTMCNC: 32.8 G/DL (ref 31.5–35.7)
MCV RBC AUTO: 88.5 FL (ref 79–97)
MONOCYTES # BLD AUTO: 0.37 10*3/MM3 (ref 0.1–0.9)
MONOCYTES NFR BLD AUTO: 7.2 % (ref 5–12)
NEUTROPHILS NFR BLD AUTO: 3.39 10*3/MM3 (ref 1.7–7)
NEUTROPHILS NFR BLD AUTO: 66.4 % (ref 42.7–76)
NRBC BLD AUTO-RTO: 0 /100 WBC (ref 0–0.2)
PHOSPHATE SERPL-MCNC: 4.9 MG/DL (ref 2.5–4.5)
PLATELET # BLD AUTO: 241 10*3/MM3 (ref 140–450)
PMV BLD AUTO: 9.7 FL (ref 6–12)
POTASSIUM SERPL-SCNC: 4.1 MMOL/L (ref 3.5–5.2)
POTASSIUM SERPL-SCNC: 4.1 MMOL/L (ref 3.5–5.2)
PROT SERPL-MCNC: 7 G/DL (ref 6–8.5)
RBC # BLD AUTO: 2.62 10*6/MM3 (ref 3.77–5.28)
SODIUM SERPL-SCNC: 135 MMOL/L (ref 136–145)
SODIUM SERPL-SCNC: 135 MMOL/L (ref 136–145)
WBC NRBC COR # BLD AUTO: 5.11 10*3/MM3 (ref 3.4–10.8)

## 2025-03-20 PROCEDURE — 25010000002 LIDOCAINE PF 2% 2 % SOLUTION

## 2025-03-20 PROCEDURE — 84100 ASSAY OF PHOSPHORUS: CPT | Performed by: INTERNAL MEDICINE

## 2025-03-20 PROCEDURE — 94799 UNLISTED PULMONARY SVC/PX: CPT

## 2025-03-20 PROCEDURE — 25010000002 MIDAZOLAM PER 1MG: Performed by: ANESTHESIOLOGY

## 2025-03-20 PROCEDURE — C1758 CATHETER, URETERAL: HCPCS | Performed by: UROLOGY

## 2025-03-20 PROCEDURE — 74176 CT ABD & PELVIS W/O CONTRAST: CPT

## 2025-03-20 PROCEDURE — 94761 N-INVAS EAR/PLS OXIMETRY MLT: CPT

## 2025-03-20 PROCEDURE — 0T768DZ DILATION OF RIGHT URETER WITH INTRALUMINAL DEVICE, VIA NATURAL OR ARTIFICIAL OPENING ENDOSCOPIC: ICD-10-PCS | Performed by: UROLOGY

## 2025-03-20 PROCEDURE — C2617 STENT, NON-COR, TEM W/O DEL: HCPCS | Performed by: UROLOGY

## 2025-03-20 PROCEDURE — 80053 COMPREHEN METABOLIC PANEL: CPT | Performed by: INTERNAL MEDICINE

## 2025-03-20 PROCEDURE — 85025 COMPLETE CBC W/AUTO DIFF WBC: CPT | Performed by: INTERNAL MEDICINE

## 2025-03-20 PROCEDURE — 25010000002 ONDANSETRON PER 1 MG

## 2025-03-20 PROCEDURE — 25010000002 CEFTRIAXONE PER 250 MG: Performed by: UROLOGY

## 2025-03-20 PROCEDURE — 99221 1ST HOSP IP/OBS SF/LOW 40: CPT | Performed by: UROLOGY

## 2025-03-20 PROCEDURE — 25010000002 HYDROMORPHONE 1 MG/ML SOLUTION

## 2025-03-20 PROCEDURE — 25010000002 DEXAMETHASONE PER 1 MG

## 2025-03-20 PROCEDURE — 25810000003 LACTATED RINGERS PER 1000 ML: Performed by: ANESTHESIOLOGY

## 2025-03-20 PROCEDURE — C1769 GUIDE WIRE: HCPCS | Performed by: UROLOGY

## 2025-03-20 PROCEDURE — 99233 SBSQ HOSP IP/OBS HIGH 50: CPT | Performed by: INTERNAL MEDICINE

## 2025-03-20 PROCEDURE — 74420 UROGRAPHY RTRGR +-KUB: CPT | Performed by: UROLOGY

## 2025-03-20 PROCEDURE — 25010000002 SUGAMMADEX 200 MG/2ML SOLUTION

## 2025-03-20 PROCEDURE — 25010000002 METOCLOPRAMIDE PER 10 MG: Performed by: ANESTHESIOLOGY

## 2025-03-20 PROCEDURE — BT1DZZZ FLUOROSCOPY OF RIGHT KIDNEY, URETER AND BLADDER: ICD-10-PCS | Performed by: UROLOGY

## 2025-03-20 PROCEDURE — 76000 FLUOROSCOPY <1 HR PHYS/QHP: CPT

## 2025-03-20 PROCEDURE — 52332 CYSTOSCOPY AND TREATMENT: CPT | Performed by: UROLOGY

## 2025-03-20 PROCEDURE — 25010000002 PROPOFOL 10 MG/ML EMULSION

## 2025-03-20 DEVICE — ST STNT URETRL SIL FILFRM DBLPIG RO 6F 26CM BLK: Type: IMPLANTABLE DEVICE | Site: URETER | Status: FUNCTIONAL

## 2025-03-20 RX ORDER — PROMETHAZINE HYDROCHLORIDE 25 MG/1
25 TABLET ORAL ONCE AS NEEDED
Status: DISCONTINUED | OUTPATIENT
Start: 2025-03-20 | End: 2025-03-20 | Stop reason: HOSPADM

## 2025-03-20 RX ORDER — ONDANSETRON 2 MG/ML
4 INJECTION INTRAMUSCULAR; INTRAVENOUS ONCE AS NEEDED
Status: DISCONTINUED | OUTPATIENT
Start: 2025-03-20 | End: 2025-03-20 | Stop reason: HOSPADM

## 2025-03-20 RX ORDER — SODIUM CHLORIDE, SODIUM LACTATE, POTASSIUM CHLORIDE, CALCIUM CHLORIDE 600; 310; 30; 20 MG/100ML; MG/100ML; MG/100ML; MG/100ML
9 INJECTION, SOLUTION INTRAVENOUS CONTINUOUS PRN
Status: DISCONTINUED | OUTPATIENT
Start: 2025-03-20 | End: 2025-03-20 | Stop reason: HOSPADM

## 2025-03-20 RX ORDER — ONDANSETRON 2 MG/ML
INJECTION INTRAMUSCULAR; INTRAVENOUS AS NEEDED
Status: DISCONTINUED | OUTPATIENT
Start: 2025-03-20 | End: 2025-03-20 | Stop reason: SURG

## 2025-03-20 RX ORDER — ROCURONIUM BROMIDE 10 MG/ML
INJECTION, SOLUTION INTRAVENOUS AS NEEDED
Status: DISCONTINUED | OUTPATIENT
Start: 2025-03-20 | End: 2025-03-20 | Stop reason: SURG

## 2025-03-20 RX ORDER — CITRIC ACID/SODIUM CITRATE 334-500MG
30 SOLUTION, ORAL ORAL ONCE
Status: COMPLETED | OUTPATIENT
Start: 2025-03-20 | End: 2025-03-20

## 2025-03-20 RX ORDER — LIDOCAINE HYDROCHLORIDE 20 MG/ML
INJECTION, SOLUTION EPIDURAL; INFILTRATION; INTRACAUDAL; PERINEURAL AS NEEDED
Status: DISCONTINUED | OUTPATIENT
Start: 2025-03-20 | End: 2025-03-20 | Stop reason: SURG

## 2025-03-20 RX ORDER — ACETAMINOPHEN 500 MG
1000 TABLET ORAL ONCE
Status: COMPLETED | OUTPATIENT
Start: 2025-03-20 | End: 2025-03-20

## 2025-03-20 RX ORDER — PROMETHAZINE HYDROCHLORIDE 25 MG/1
25 SUPPOSITORY RECTAL ONCE AS NEEDED
Status: DISCONTINUED | OUTPATIENT
Start: 2025-03-20 | End: 2025-03-20 | Stop reason: HOSPADM

## 2025-03-20 RX ORDER — MIDAZOLAM HYDROCHLORIDE 2 MG/2ML
2 INJECTION, SOLUTION INTRAMUSCULAR; INTRAVENOUS ONCE
Status: COMPLETED | OUTPATIENT
Start: 2025-03-20 | End: 2025-03-20

## 2025-03-20 RX ORDER — METOCLOPRAMIDE HYDROCHLORIDE 5 MG/ML
10 INJECTION INTRAMUSCULAR; INTRAVENOUS ONCE
Status: COMPLETED | OUTPATIENT
Start: 2025-03-20 | End: 2025-03-20

## 2025-03-20 RX ORDER — OXYCODONE HYDROCHLORIDE 5 MG/1
5 TABLET ORAL
Status: DISCONTINUED | OUTPATIENT
Start: 2025-03-20 | End: 2025-03-20 | Stop reason: HOSPADM

## 2025-03-20 RX ORDER — PROPOFOL 10 MG/ML
VIAL (ML) INTRAVENOUS AS NEEDED
Status: DISCONTINUED | OUTPATIENT
Start: 2025-03-20 | End: 2025-03-20 | Stop reason: SURG

## 2025-03-20 RX ORDER — DEXAMETHASONE SODIUM PHOSPHATE 4 MG/ML
INJECTION, SOLUTION INTRA-ARTICULAR; INTRALESIONAL; INTRAMUSCULAR; INTRAVENOUS; SOFT TISSUE AS NEEDED
Status: DISCONTINUED | OUTPATIENT
Start: 2025-03-20 | End: 2025-03-20 | Stop reason: SURG

## 2025-03-20 RX ADMIN — PROPOFOL 50 MG: 10 INJECTION, EMULSION INTRAVENOUS at 16:30

## 2025-03-20 RX ADMIN — AMLODIPINE BESYLATE 10 MG: 10 TABLET ORAL at 09:19

## 2025-03-20 RX ADMIN — ACETAMINOPHEN 1000 MG: 500 TABLET ORAL at 16:18

## 2025-03-20 RX ADMIN — ONDANSETRON 4 MG: 2 INJECTION INTRAMUSCULAR; INTRAVENOUS at 16:37

## 2025-03-20 RX ADMIN — MIDAZOLAM HYDROCHLORIDE 2 MG: 1 INJECTION, SOLUTION INTRAMUSCULAR; INTRAVENOUS at 16:18

## 2025-03-20 RX ADMIN — SUGAMMADEX 400 MG: 100 INJECTION, SOLUTION INTRAVENOUS at 16:57

## 2025-03-20 RX ADMIN — PROPOFOL 150 MG: 10 INJECTION, EMULSION INTRAVENOUS at 16:27

## 2025-03-20 RX ADMIN — BUPROPION HYDROCHLORIDE 150 MG: 150 TABLET, EXTENDED RELEASE ORAL at 09:19

## 2025-03-20 RX ADMIN — LIDOCAINE HYDROCHLORIDE 50 MG: 20 INJECTION, SOLUTION INTRAVENOUS at 16:27

## 2025-03-20 RX ADMIN — PROPOFOL 175 MCG/KG/MIN: 10 INJECTION, EMULSION INTRAVENOUS at 16:27

## 2025-03-20 RX ADMIN — HYDROMORPHONE HYDROCHLORIDE 0.5 MG: 1 INJECTION, SOLUTION INTRAMUSCULAR; INTRAVENOUS; SUBCUTANEOUS at 16:36

## 2025-03-20 RX ADMIN — Medication 10 ML: at 09:19

## 2025-03-20 RX ADMIN — METOCLOPRAMIDE 10 MG: 5 INJECTION, SOLUTION INTRAMUSCULAR; INTRAVENOUS at 16:18

## 2025-03-20 RX ADMIN — ROCURONIUM BROMIDE 50 MG: 10 INJECTION, SOLUTION INTRAVENOUS at 16:30

## 2025-03-20 RX ADMIN — DEXAMETHASONE SODIUM PHOSPHATE 4 MG: 4 INJECTION, SOLUTION INTRAMUSCULAR; INTRAVENOUS at 16:37

## 2025-03-20 RX ADMIN — SODIUM CHLORIDE 1000 MG: 9 INJECTION INTRAMUSCULAR; INTRAVENOUS; SUBCUTANEOUS at 15:23

## 2025-03-20 RX ADMIN — APIXABAN 5 MG: 5 TABLET, FILM COATED ORAL at 09:19

## 2025-03-20 RX ADMIN — Medication 10 ML: at 20:00

## 2025-03-20 RX ADMIN — SODIUM CHLORIDE, POTASSIUM CHLORIDE, SODIUM LACTATE AND CALCIUM CHLORIDE: 600; 310; 30; 20 INJECTION, SOLUTION INTRAVENOUS at 16:16

## 2025-03-20 RX ADMIN — Medication 10 ML: at 00:41

## 2025-03-20 RX ADMIN — SODIUM CITRATE AND CITRIC ACID MONOHYDRATE 30 ML: 334; 500 SOLUTION ORAL at 16:19

## 2025-03-20 RX ADMIN — APIXABAN 5 MG: 5 TABLET, FILM COATED ORAL at 00:41

## 2025-03-20 NOTE — PLAN OF CARE
Goal Outcome Evaluation:           Progress: no change  Outcome Evaluation: Pt alert and oriented with friend at bedside. No complaints during shift. Procedure completed with stent and interiano left by urology. Continue plan of care.

## 2025-03-20 NOTE — PROGRESS NOTES
[facility]   Hospitalist Progress Note  Date: 3/20/2025  Patient Name: Katherine Rodriguez  : 1980  MRN: [id number]  Date of admission: 3/19/2025      Subjective   Subjective     Chief Complaint: Follow up for acute kidney injury    Summary: 44-year-old female history of renal cell carcinoma status post left nephrectomy, chronic anemia, hypertension, admitted after outpatient labs showed GURINDER on CKD.  Creatinine 5.34 with baseline creatinine around 2.2.  BUN 46, bicarb normal, anion gap 15. Denied recent vomiting or diarrhea.  Improving with IV fluids.  Nephrology on board    Interval Followup:   Blood pressure controlled  Resting comfortably this morning.   No fever or chills, flank pain or abdominal pain, vomiting or diarrhea    Objective   Objective     Vitals:   Temp:  [97.7 °F (36.5 °C)-98.1 °F (36.7 °C)] 98.1 °F (36.7 °C)  Heart Rate:  [] 88  Resp:  [16-18] 16  BP: (129-178)/() 149/80  Physical Exam    Constitutional: conversant, NAD   Respiratory:  nonlabored respirations    Cardiovascular:  RRR, no edema   Gastrointestinal: soft, nondistended   Neurologic: Alert, speech clear   Skin: Extremities warm    Result Review    Result Review:  I have personally reviewed the following over the last 24 hours (07:00 to 07:00) and agree with the following findings  [x]  Laboratory  CBC          3/17/2025    09:30 3/19/2025    13:27 3/20/2025    06:17   CBC   WBC 5.72  6.57  5.11    RBC 2.83  2.54  2.62    Hemoglobin 8.5  7.4  7.6    Hematocrit 25.8  22.7  23.2    MCV 91.2  89.4  88.5    MCH 30.0  29.1  29.0    MCHC 32.9  32.6  32.8    RDW 15.1  14.9  14.8    Platelets 306  277  241      CMP          3/17/2025    09:30 3/19/2025    13:27 3/20/2025    06:17   CMP   Glucose 92  94  86     84    BUN 46  38  35     37    Creatinine 5.34  4.90  4.15     4.15    EGFR 9.6  10.6  12.9     12.9    Sodium 139  137  135     135    Potassium 4.0  4.0  4.1     4.1    Chloride 100  100  102     102    Calcium  9.6  10.1  9.1     9.2    Total Protein 8.7  8.5  7.0    Albumin 4.3  4.1  3.4     3.4    Globulin 4.4  4.4  3.6    Total Bilirubin 0.2  0.2  0.2    Alkaline Phosphatase 116  115  99    AST (SGOT) 9  8  6    ALT (SGPT) <5  <5  <5    Albumin/Globulin Ratio 1.0  0.9  0.9    BUN/Creatinine Ratio 8.6  7.8  8.4     8.9    Anion Gap 15.3  14.5  12.0     12.7          [x]  Microbiology  []  Radiology   []  EKG/Telemetry   []  Cardiology/Vascular   []  Pathology  []  Old records  [x]  Other:    Intake/Output Summary (Last 24 hours) at 3/20/2025 1217  Last data filed at 3/20/2025 1200  Gross per 24 hour   Intake 360 ml   Output 100 ml   Net 260 ml         Assessment & Plan   Assessment / Plan     Assessment/Plan:  GURINDER on CKD stage IV  Right hydronephrosis  History of left RCC status post nephrectomy on Pembrolizumab  History of right ureter injury  Essential hypertension         Creatinine 4.9 on admission with baseline around 2.2-2.4  Received IV fluids yesterday with improvement in creatinine down to 4.15  No significant acidosis or electrolyte abnormality  Nephrology following, appreciate assistance. Discussed ultrasound findings and plan with Dr. Rivers  Renal ultrasound from 3/17 reported mild to moderate right-sided hydronephrosis  Per chart review had right ureteral stent removed in February to setting of ureteral injury which was done at [facility]  Will obtain stat CT abdomen pelvis without contrast /if confirms hydronephrosis, urology to assist with stent placement if needed. Discussed with KAN Padilla  Made strict n.p.o.  Eliquis held but did receive a dose this morning  Daily BMP    Discussed plan with RN.    I spent greater than 50 minutes minutes of time for evaluation and management of this patient including review of chart, labs pertinent imaging available as well as medical decision making and discussion with physicians, staff and patient.       CODE STATUS:   Code Status (Patient has no pulse and is not breathing):  CPR (Attempt to Resuscitate)  Medical Interventions (Patient has pulse or is breathing): Full Support    Electronically signed by Ryley Tanner DO, 03/20/25, 12:21 PM EDT.

## 2025-03-20 NOTE — H&P (VIEW-ONLY)
[facility]   UROLOGY Consult Note    Patient Name: Katherine Rodriguez  : 1980  MRN: [id number]  Primary Care Physician:  Cecily Nicholas APRN  Referring Physician: No Known Provider  Date of admission: 3/19/2025    Subjective   Subjective     Reason for Consult/ Chief Complaint: Right hydronephrosis, GURINDER, solitary kidney    HPI:  Katherine Rodriguez is a 44 y.o. female admitted for medical management of anemia, abnormal labs at recommendation of oncology.  Known to [facility] urology, Dr. Deal,S/P a Open Left Nephrectomy, Paraaortic Lymphadenectomy, Right Ureteroureterostomy & Inferior Vena Cava Thrombectomy and Repair for a cT3b RCCa on the left, with operation complicated by right ureteral injury, 2024.  Per chart review, postop course complicated by GURINDER, and Right atrial thrombus requiring anticoagulation.   Stent in February.  On Keytruda.  Referred to nephrology for creatinine 2.0; repeat labs showed creatinine up to 5; ultrasound with mild to moderate right sided hydronephrosis.  Admitted for medical management.    CT abdomen pelvis stone protocol confirms right hydroureteronephrosis, mild to moderate, some inflammatory changes and stranding of the right hemipelvis.    Patient denies significant flank pain.  States she is making urine.  No urinary complaints.    Urology consulted for further evaluation    Review of Systems   All systems were reviewed and negative except for: per H&P    Personal History     Past Medical History:   Diagnosis Date   • Anxiety    • Cancer 2024    Kidney cancer. Took out my left kidney.   • History of transfusion 2024    Had to get blood for surgery and after for my hemoglobin being low.   • Hypertension    • Multiple gestation 1999   • Normocytic anemia    • Trichomonas vaginalis infection 2024   • Varicella     Whwn i was a younger child.   • Vitamin D deficiency        Past Surgical History:   Procedure Laterality Date    • BLADDER REPAIR W/  SECTION     •  SECTION  2013   • CHOLECYSTECTOMY     • LAPAROSCOPIC CHOLECYSTECTOMY     • NEPHRECTOMY RADICAL Left 2024    cancer   • TONSILLECTOMY     • TUBAL ABDOMINAL LIGATION  2021       Family History: family history includes Breast cancer in her paternal aunt; Diabetes in her maternal grandmother and mother; Hypertension in her father. Otherwise pertinent FHx was reviewed and not pertinent to current issue.    Social History:  reports that she has been smoking cigarettes. She started smoking about 32 years ago. She has a 48.3 pack-year smoking history. She does not have any smokeless tobacco history on file. She reports that she does not drink alcohol and does not use drugs.    Home Medications:  Vitamin D, amLODIPine, apixaban, buPROPion SR, ferrous gluconate, folic acid, hydroCHLOROthiazide, lisinopril, and ondansetron    Allergies:  No Known Allergies    Objective    Objective     Vitals:   Temp:  [97.7 °F (36.5 °C)-98.1 °F (36.7 °C)] 98 °F (36.7 °C)  Heart Rate:  [86-99] 86  Resp:  [16] 16  BP: (129-155)/() 142/84    Physical Exam:   No acute distress, well-nourished  Lungs: Clear, unlabored  CV: Regular rate, no chest retractions  Awake alert and oriented  Mood normal; affect normal    Result Review    Result Review:  I have personally reviewed the results from the time of this admission to 3/20/2025 14:09 EDT and agree with these findings:  [x]  Laboratory  []  Microbiology  []  Radiology  []  EKG/Telemetry   []  Cardiology/Vascular   []  Pathology  []  Old records  []  Other:      Assessment & Plan   Assessment / Plan     Brief Patient Summary:  Katherine Rodriguez is a 44 y.o. female who     Active Hospital Problems:  Active Hospital Problems    Diagnosis    • **GURINDER (acute kidney injury)    • Solitary kidney, acquired    • Other hydronephrosis    • Renal cell carcinoma of left kidney        Plan:   Labs and imaging  reviewed, discussed with patient at length  Plan for emergent decompression right upper tract via cystoscopy,  retrograde pyelogram, ureteral stent insertion  Consent obtained; Risks, benefits and alternatives were discussed including bleeding, infection, damage to surrounding structures, pain, worsening lower urinary tract symptoms, need for further procedures and management, and risk of anesthesia including up to death..    Notes understanding that the stent is only a temporizing measure and if left in place could cause loss or damage of kidney.  Will warrant definitive stone surgery once infection completely treated and acute issues are resolved.  Admitted by hospitalist service  N.p.o.  Preop [facility]    Patient's urologist, Dr. Sarkis Deal, [facility], aware of admission and plan    Right side marked  OR notified  All questions addressed         Electronically signed by Veronica Lawrence MD, 03/20/25, 2:09 PM EDT.

## 2025-03-20 NOTE — OP NOTE
Preoperative diagnosis  Right hydronephrosis, solitary kidney    Postoperative diagnosis  Right hydronephrosis, solitary kidney    Procedure performed  Cystoscopy, right retrograde pyelogram, ureteral stent insertion    Attending surgeon  Veronica Lawrence MD     Anesthesia  General    EBL  0 mL    Complications  None    Specimen  None    Findings  Cystoscopy with diffuse cystitis cystica; bilateral orthotopic ureter; right retrograde pyelogram with mild proximal hydronephrosis, somewhat narrowed mid ureter; no distinct transition point or stricture; no contrast extravasation  Placement of 6 x 26 black beauty stent no string    Indications  44 y.o. female agreed to undergo the above named procedure after discussion of the alternatives, risks and benefits.   Informed consent was obtained.      Procedure  After informed consent was obtained, the patient was taken back to the operating suite where general anesthesia was administered. Once the patient was adequately anesthetized, they were placed in the dorsal lithotomy position. The genitals were prepped and draped in a normal sterile fashion.  Rigid cystoscope was inserted gently in the patient's urethra meatus, which passed atraumatically into the bladder; pan cystoscopy was performed a systematic manner.  Diffuse mild cystitis cystica noted.  Bilateral orthotopic ureters were identified.  A Pollack catheter was then placed in the distal right ureter in order to obtain retrograde pyelogram. Retrograde pyelogram was performed revealing mild proximal hydronephrosis, the mid ureter appeared somewhat narrowed but no discrete stricture appreciated; no filling defect or extravasation of contrast.  The wire was then placed after retrograde pyelogram, the Pollack was reduced.  A 6 x 26 black beauty stent was placed over the wire under direct visualization.  Upon insertion of the stent there was E flux of cloudy urine from the ureteral orifice.  Upon retrieval of the wire,  there was good position within the upper pole x-ray, as well as distal coil within the bladder.  Cystoscope removed with bladder remaining full.  16 [place]ian Darby catheter was inserted, balloon inflated and catheter secured.  At this point, the procedure was deemed terminated.  The patient was then placed back in the supine position and awakened from general anesthesia and transferred to the PACU in good condition.     Signed:  Veronica Lawrence MD  03/20/25  16:55 EDT

## 2025-03-20 NOTE — ANESTHESIA POSTPROCEDURE EVALUATION
Patient: Katherine Rodriguez    Procedure Summary       Date: 03/20/25 Room / Location: [facility] OR 08 / [facility] MAIN OR    Anesthesia Start: 1622 Anesthesia Stop: 1705    Procedure: CYSTOSCOPY RETROGRADE PYELOGRAM AND STENT INSERTION, right  Plain text: Scope bladder, shoot x-ray, place stent, right (Right) Diagnosis:       GURINDER (acute kidney injury)      Solitary kidney, acquired      Hydronephrosis, unspecified hydronephrosis type      (GURINDER (acute kidney injury) [N17.9])      (Solitary kidney, acquired [Z90.5])      (Hydronephrosis, unspecified hydronephrosis type [N13.30])    Surgeons: Veronica Lawrence MD Provider: Ran Malik MD    Anesthesia Type: general ASA Status: 3 - Emergent            Anesthesia Type: general    Vitals  Vitals Value Taken Time   /77 03/20/25 17:09   Temp     Pulse 93 03/20/25 17:13   Resp     SpO2 98 % 03/20/25 17:13   Vitals shown include unfiled device data.        Post Anesthesia Care and Evaluation    Patient location during evaluation: bedside  Patient participation: complete - patient participated  Level of consciousness: awake and alert  Pain management: adequate    Airway patency: patent  Anesthetic complications: No anesthetic complications  PONV Status: none  Cardiovascular status: acceptable  Respiratory status: acceptable  Hydration status: acceptable    Comments: An Anesthesiologist personally participated in the most demanding procedures (including induction and emergence if applicable) in the anesthesia plan, monitored the course of anesthesia administration at frequent intervals and remained physically present and available for immediate diagnosis and treatment of emergencies.

## 2025-03-20 NOTE — ANESTHESIA PREPROCEDURE EVALUATION
Anesthesia Evaluation     Patient summary reviewed and Nursing notes reviewed   no history of anesthetic complications:   NPO Solid Status: > 6 hours  NPO Liquid Status: > 2 hours           Airway   Mallampati: II  TM distance: >3 FB  Neck ROM: full  No difficulty expected  Dental    (+) poor dentition and upper dentures        Pulmonary - normal exam    breath sounds clear to auscultation  (+) a smoker Current, Abstained day of surgery, cigarettes,  Cardiovascular - normal exam  Exercise tolerance: good (4-7 METS)    PT is on anticoagulation therapy  Rhythm: regular  Rate: normal    (+) hypertension (lisinopril, hctz, amlodipine), DVT (hypercoaguable due to RCC with clot in IVC; eliquis)      Neuro/Psych  (+) psychiatric history Anxiety  GI/Hepatic/Renal/Endo    (+) renal disease (h/o RCC s/p radical nephrectomy)- stones    Musculoskeletal (-) negative ROS    Abdominal   (+) obese   Substance History - negative use     OB/GYN    (+) Pregnant        Other      history of cancer    ROS/Med Hx Other: PAT Nursing Notes unavailable.               Anesthesia Plan    ASA 3 - emergent     general     (Patient understands anesthesia not responsible for dental damage.)  intravenous induction     Anesthetic plan, risks, benefits, and alternatives have been provided, discussed and informed consent has been obtained with: patient.    Use of blood products discussed with patient .    Plan discussed with CRNA.    CODE STATUS:    Code Status (Patient has no pulse and is not breathing): CPR (Attempt to Resuscitate)  Medical Interventions (Patient has pulse or is breathing): Full Support

## 2025-03-20 NOTE — PLAN OF CARE
Goal Outcome Evaluation:  Plan of Care Reviewed With: patient           Outcome Evaluation: Pt a/ox4. Room air and up ad earnest. Pt admitted today from ED for GURINDER. Pt is recovering from renal carcinoma of left kidney (removed). continued treatment but renal labs abnormal so sent to ED from treatment facility. pt is asymptomatic. Does have some trace edema on BLE. MD will add to poc and mar once pt evaluated. Continue with poc.

## 2025-03-20 NOTE — CASE MANAGEMENT/SOCIAL WORK
Discharge Planning Assessment   Ronnie     Patient Name: Katherine Rodriguez  MRN: [id number]  Today's Date: 3/19/2025    Admit Date: 3/19/2025        Discharge Needs Assessment       Row Name 03/19/25 2017       Living Environment    People in Home child(nadia), dependent (P)     Current Living Arrangements home (P)     Potentially Unsafe Housing Conditions none (P)     In the past 12 months has the electric, gas, oil, or water company threatened to shut off services in your home? No (P)     Able to Return to Prior Arrangements yes (P)        Transportation Needs    In the past 12 months, has lack of transportation kept you from medical appointments or from getting medications? no (P)     In the past 12 months, has lack of transportation kept you from meetings, work, or from getting things needed for daily living? No (P)        Food Insecurity    Within the past 12 months, you worried that your food would run out before you got the money to buy more. Never true (P)     Within the past 12 months, the food you bought just didn't last and you didn't have money to get more. Never true (P)        Transition Planning    Patient/Family Anticipates Transition to home (P)        Discharge Needs Assessment    Readmission Within the Last 30 Days no previous admission in last 30 days (P)     Equipment Currently Used at Home none (P)     Do you want help finding or keeping work or a job? I do not need or want help (P)     Do you want help with school or training? For example, starting or completing job training or getting a high school diploma, GED or equivalent No (P)                    Discharge Plan    No documentation.                      Demographic Summary       Row Name 03/19/25 2016       General Information    Admission Type inpatient (P)     Arrived From home (P)     Reason for Consult discharge planning (P)     Preferred Language English (P)                    Functional Status       Row Name 03/19/25 2016        Functional Status    Usual Activity Tolerance moderate (P)     Current Activity Tolerance moderate (P)        Physical Activity    On average, how many days per week do you engage in moderate to strenuous exercise (like a brisk walk)? 0 days (P)     On average, how many minutes do you engage in exercise at this level? 0 min (P)     Number of minutes of exercise per week 0 (P)        Functional Status, IADL    If for any reason you need help with day-to-day activities such as bathing, preparing meals, shopping, managing finances, etc., do you get the help you need? I don't need any help (P)        Employment/    Employment Status employed full-time (P)     Shift Worked first shift (P)                    Psychosocial       Row Name 03/19/25 2017       Mental Health    Little interest or pleasure in doing things Not at all (P)     Feeling down, depressed, or hopeless Not at all (P)        Stress    Do you feel stress - tense, restless, nervous, or anxious, or unable to sleep at night because your mind is troubled all the time - these days? Not at all (P)                    Abuse/Neglect       Row Name 03/19/25 2017       Personal Safety    Feels Unsafe at Home or Work/School no (P)     Feels Threatened by Someone no (P)     Does Anyone Try to Keep You From Having Contact with Others or Doing Things Outside Your Home? no (P)     Physical Signs of Abuse Present no (P)                    Legal       Row Name 03/19/25 2017       Financial Resource Strain    How hard is it for you to pay for the very basics like food, housing, medical care, and heating? Not hard (P)        Financial/Legal    Source of Income salary/wages (P)                    Substance Abuse       Row Name 03/19/25 2017       Substance Use    Substance Use Status never used (P)                    Patient Forms    No documentation.               SW student completed discharge needs assessment with PT. Pt was able to answer all questions that were  asked. Pt reported that her PCP is Cecily Nicholas and that the preferred pharmacy is [facility]. Pt denied any resource needs at this time.     Celestino Lorenzo, Social Work Student

## 2025-03-20 NOTE — CONSULTS
[facility]   UROLOGY Consult Note    Patient Name: Katherine Rodriguez  : 1980  MRN: [id number]  Primary Care Physician:  Cecily Nicholas APRN  Referring Physician: No Known Provider  Date of admission: 3/19/2025    Subjective   Subjective     Reason for Consult/ Chief Complaint: Right hydronephrosis, GURINDER, solitary kidney    HPI:  Katherine Rodriguez is a 44 y.o. female admitted for medical management of anemia, abnormal labs at recommendation of oncology.  Known to [facility] urology, Dr. Deal,S/P a Open Left Nephrectomy, Paraaortic Lymphadenectomy, Right Ureteroureterostomy & Inferior Vena Cava Thrombectomy and Repair for a cT3b RCCa on the left, with operation complicated by right ureteral injury, 2024.  Per chart review, postop course complicated by GURINDER, and Right atrial thrombus requiring anticoagulation.   Stent in February.  On Keytruda.  Referred to nephrology for creatinine 2.0; repeat labs showed creatinine up to 5; ultrasound with mild to moderate right sided hydronephrosis.  Admitted for medical management.    CT abdomen pelvis stone protocol confirms right hydroureteronephrosis, mild to moderate, some inflammatory changes and stranding of the right hemipelvis.    Patient denies significant flank pain.  States she is making urine.  No urinary complaints.    Urology consulted for further evaluation    Review of Systems   All systems were reviewed and negative except for: per H&P    Personal History     Past Medical History:   Diagnosis Date   • Anxiety    • Cancer 2024    Kidney cancer. Took out my left kidney.   • History of transfusion 2024    Had to get blood for surgery and after for my hemoglobin being low.   • Hypertension    • Multiple gestation 1999   • Normocytic anemia    • Trichomonas vaginalis infection 2024   • Varicella     Whwn i was a younger child.   • Vitamin D deficiency        Past Surgical History:   Procedure Laterality Date    • BLADDER REPAIR W/  SECTION     •  SECTION  2013   • CHOLECYSTECTOMY     • LAPAROSCOPIC CHOLECYSTECTOMY     • NEPHRECTOMY RADICAL Left 2024    cancer   • TONSILLECTOMY     • TUBAL ABDOMINAL LIGATION  2021       Family History: family history includes Breast cancer in her paternal aunt; Diabetes in her maternal grandmother and mother; Hypertension in her father. Otherwise pertinent FHx was reviewed and not pertinent to current issue.    Social History:  reports that she has been smoking cigarettes. She started smoking about 32 years ago. She has a 48.3 pack-year smoking history. She does not have any smokeless tobacco history on file. She reports that she does not drink alcohol and does not use drugs.    Home Medications:  Vitamin D, amLODIPine, apixaban, buPROPion SR, ferrous gluconate, folic acid, hydroCHLOROthiazide, lisinopril, and ondansetron    Allergies:  No Known Allergies    Objective    Objective     Vitals:   Temp:  [97.7 °F (36.5 °C)-98.1 °F (36.7 °C)] 98 °F (36.7 °C)  Heart Rate:  [86-99] 86  Resp:  [16] 16  BP: (129-155)/() 142/84    Physical Exam:   No acute distress, well-nourished  Lungs: Clear, unlabored  CV: Regular rate, no chest retractions  Awake alert and oriented  Mood normal; affect normal    Result Review    Result Review:  I have personally reviewed the results from the time of this admission to 3/20/2025 14:09 EDT and agree with these findings:  [x]  Laboratory  []  Microbiology  []  Radiology  []  EKG/Telemetry   []  Cardiology/Vascular   []  Pathology  []  Old records  []  Other:      Assessment & Plan   Assessment / Plan     Brief Patient Summary:  Katherine Rodriguez is a 44 y.o. female who     Active Hospital Problems:  Active Hospital Problems    Diagnosis    • **GURINDER (acute kidney injury)    • Solitary kidney, acquired    • Other hydronephrosis    • Renal cell carcinoma of left kidney        Plan:   Labs and imaging  reviewed, discussed with patient at length  Plan for emergent decompression right upper tract via cystoscopy,  retrograde pyelogram, ureteral stent insertion  Consent obtained; Risks, benefits and alternatives were discussed including bleeding, infection, damage to surrounding structures, pain, worsening lower urinary tract symptoms, need for further procedures and management, and risk of anesthesia including up to death..    Notes understanding that the stent is only a temporizing measure and if left in place could cause loss or damage of kidney.  Will warrant definitive stone surgery once infection completely treated and acute issues are resolved.  Admitted by hospitalist service  N.p.o.  Preop [facility]    Patient's urologist, Dr. Sarkis Deal, [facility], aware of admission and plan    Right side marked  OR notified  All questions addressed         Electronically signed by Veronica Lawrence MD, 03/20/25, 2:09 PM EDT.

## 2025-03-20 NOTE — H&P
[facility]  Date: 3/19/2025   Patient Name: Katherine Rodriguez  : 1980  MRN: [id number]  Primary Care Physician:  Cecily Nicholas APRN  Date of admission: 3/19/2025    Subjective   Subjective     Chief Complaint: Abnormal labs    HPI:    Katherine Rodriguez is a 44 y.o. female past medical history of renal cell carcinoma status post left nephrectomy that presents to the emergency room for evaluation of abnormal labs.  Patient follows with her hematologist oncologist and had labs drawn which showed worsening renal function.  She denies any other fevers, chills, sweats, nausea, vomiting, chest pain, shortness of breath, palpitations, abdominal pain, diarrhea constipation, dysuria, weakness, rash.  Nephrology was called from the ER and will evaluate in AM.  She received some IV fluid but nephrology did not recommend any more into the evaluate Per ER signout      Personal History     Past Medical History:  Past Medical History:   Diagnosis Date   • Anxiety    • Cancer 2024    Kidney cancer. Took out my left kidney.   • History of transfusion 2024    Had to get blood for surgery and after for my hemoglobin being low.   • Hypertension    • Multiple gestation 1999   • Normocytic anemia    • Trichomonas vaginalis infection 2024   • Varicella     Whwn i was a younger child.   • Vitamin D deficiency          Past Surgical History:  Past Surgical History:   Procedure Laterality Date   • BLADDER REPAIR W/  SECTION     •  SECTION  2013   • CHOLECYSTECTOMY     • LAPAROSCOPIC CHOLECYSTECTOMY     • NEPHRECTOMY RADICAL Left 2024    cancer   • TONSILLECTOMY     • TUBAL ABDOMINAL LIGATION  2021         Family History:   Family History   Problem Relation Age of Onset   • Hypertension Father    • Diabetes Mother    • Diabetes Maternal Grandmother    • Breast cancer Paternal Aunt    • Ovarian cancer Neg Hx    •  Uterine cancer Neg Hx    • Colon cancer Neg Hx    • Deep vein thrombosis Neg Hx    • Pulmonary embolism Neg Hx          Social History:   Social History     Tobacco Use   • Smoking status: Every Day     Current packs/day: 1.50     Average packs/day: 1.5 packs/day for 32.2 years (48.3 ttl pk-yrs)     Types: Cigarettes     Start date: 1/1/1993   Vaping Use   • Vaping status: Never Used   Substance Use Topics   • Alcohol use: Never   • Drug use: Never         Home Medications:  Vitamin D, amLODIPine, apixaban, buPROPion SR, ferrous gluconate, folic acid, hydroCHLOROthiazide, lisinopril, and ondansetron    Allergies:  No Known Allergies    Review of Systems   All systems were reviewed and negative except for: Abnormal labs    Objective   Objective     Vitals:   Temp:  [97.9 °F (36.6 °C)-98 °F (36.7 °C)] 98 °F (36.7 °C)  Heart Rate:  [] 90  Resp:  [16-18] 16  BP: (129-178)/(85-96) 144/96    Physical Exam    Constitutional: Awake, alert, no acute distress   Eyes: Pupils equal, sclerae anicteric, no conjunctival injection   HENT: NCAT, mucous membranes moist   Neck: Supple, no thyromegaly, no lymphadenopathy, trachea midline   Respiratory: Clear to auscultation bilaterally, nonlabored respirations    Cardiovascular: RRR, no murmurs, rubs, or gallops, palpable pedal pulses bilaterally   Gastrointestinal: Positive bowel sounds, soft, nontender, nondistended   Musculoskeletal: No bilateral ankle edema, no clubbing or cyanosis to extremities   Psychiatric: Appropriate affect, cooperative   Neurologic: Oriented x 3, strength symmetric in all extremities, Cranial Nerves grossly intact to confrontation, speech clear   Skin: No rashes     Result Review    Result Review:  I have personally reviewed the results from the time of this admission to 3/19/2025 20:15 EDT and agree with these findings:  [x]  Laboratory  []  Microbiology  [x]  Radiology  []  EKG/Telemetry   []  Cardiology/Vascular   []  Pathology  []  Old records  []   Other:      Assessment & Plan   Assessment / Plan     Assessment/Plan:   Acute renal failure: Nephrology called from the ER, appreciate recommendations.  Monitor serial labs and intake and output.  Avoid nephrotoxins and renally dose medications.  History of renal cell carcinoma: Outpatient follow-up  Hypertension: Add back home medications      VTE Prophylaxis:  Pharmacologic VTE prophylaxis orders are signed & held.          CODE STATUS:    Code Status (Patient has no pulse and is not breathing): CPR (Attempt to Resuscitate)  Medical Interventions (Patient has pulse or is breathing): Full Support      Admission Status:  I believe this patient meets inpatient status.    Electronically signed by Pierre Le Jr, MD, 03/19/25, 8:15 PM EDT.

## 2025-03-20 NOTE — CONSULTS
[facility]   Nephrology Consult Note      Patient Name: Katherine Rodriguez  : 1980  MRN: [id number]  Primary Care Physician:  Cecily Nicholas APRN  Referring Physician: No Known Provider  Date of admission: 3/19/2025    Subjective   Subjective     Reason for Consult/ Chief Complaint: Acute kidney injury with solitary kidney.    HPI:  Katherine Rodriguez is a 44 y.o. female with history of metastatic renal cell cancer, diagnosed late last year and underwent left nephrectomy by Dr. Deal at [facility].  She had renal vein thrombosis and IVC thrombus.  These were removed.  The procedure was complicated by injury to the right side structure requiring a right-sided stent placement.  This was removed in February.  She has not been feeling well but no specific complaints.    She has been receiving Keytruda under the care of oncology.  She was referred to our office for creatinine of 2.0, was seen by our nurse practitioner Layla Love last week.    Repeat labs showed worsening kidney function, creatinine up to 5.0.  Ultrasound showed mild to moderate right-sided hydronephrosis.  Admitted for further management.  No nausea, vomiting or diarrhea.  No urgency to void, no fevers or chills.  Does not use NSAIDs.  Among her home medications were lisinopril/hydrochlorothiazide    Patient was seen earlier this morning, this is a late note entry.    Review of Systems   All systems were reviewed and negative except for: What is mentioned above.    Personal History     Past Medical History:   Diagnosis Date   • Anxiety    • Cancer 2024    Kidney cancer. Took out my left kidney.   • History of transfusion 2024    Had to get blood for surgery and after for my hemoglobin being low.   • Hypertension    • Multiple gestation 1999   • Normocytic anemia    • Trichomonas vaginalis infection 2024   • Varicella     Whwn i was a younger child.   • Vitamin D deficiency        Past Surgical  History:   Procedure Laterality Date   • BLADDER REPAIR W/  SECTION     •  SECTION  2013   • CHOLECYSTECTOMY     • LAPAROSCOPIC CHOLECYSTECTOMY     • NEPHRECTOMY RADICAL Left 2024    cancer   • TONSILLECTOMY     • TUBAL ABDOMINAL LIGATION  2021       Family History: family history includes Breast cancer in her paternal aunt; Diabetes in her maternal grandmother and mother; Hypertension in her father. Otherwise pertinent FHx was reviewed and not pertinent to current issue.    Social History:  reports that she has been smoking cigarettes. She started smoking about 32 years ago. She has a 48.3 pack-year smoking history. She does not have any smokeless tobacco history on file. She reports that she does not drink alcohol and does not use drugs.    Home Medications:  Vitamin D, amLODIPine, apixaban, buPROPion SR, ferrous gluconate, folic acid, hydroCHLOROthiazide, lisinopril, and ondansetron    Allergies:  No Known Allergies    Objective    Objective     Vitals:   Temp:  [97.7 °F (36.5 °C)-98.1 °F (36.7 °C)] 98 °F (36.7 °C)  Heart Rate:  [86-96] 86  Resp:  [16] 16  BP: (129-154)/() 142/84     Physical Exam    Constitutional: Awake, alert   Eyes: sclerae anicteric, no conjunctival injection   HENT: mucous membranes moist   Neck: Supple, no thyromegaly, no lymphadenopathy, trachea midline, No JVD   Respiratory: Clear to auscultation bilaterally, nonlabored respirations    Cardiovascular: RRR, no murmurs, rubs, or gallops.   Gastrointestinal: Positive bowel sounds, soft, nontender, nondistended   Musculoskeletal: No edema, no clubbing or cyanosis   Psychiatric: Appropriate affect, cooperative   Neurologic: Oriented x 3, moving all extremities, Cranial Nerves grossly intact, speech clear   Skin: warm and dry, no rashes     Result Review    Result Reviewed:  I have personally reviewed the results from the time of this admission to 3/20/2025 15:52 EDT and agree with  these findings:  [x]  Laboratory  []  Microbiology  [x]  Radiology  []  EKG/Telemetry   []  Cardiology/Vascular   []  Pathology  [x]  Old records  []  Other:  LAB RESULTS:    LAB RESULTS:        Lab 03/20/25  0617 03/19/25  1327 03/17/25  0930   SODIUM 135*  135* 137 139   POTASSIUM 4.1  4.1 4.0 4.0   CHLORIDE 102  102 100 100   CO2 20.3*  21.0* 22.5 23.7   BUN 37*  35* 38* 46*   CREATININE 4.15*  4.15* 4.90* 5.34*   GLUCOSE 84  86 94 92   EGFR 12.9*  12.9* 10.6* 9.6*   ANION GAP 12.7  12.0 14.5 15.3*   PHOSPHORUS 4.9*  --   --      US Renal Limited  Result Date: 3/17/2025  US RENAL LIMITED Date of Exam: 3/17/2025 12:56 PM EDT Indication: elevated creatinine. Comparison: No comparisons available. Technique: Grayscale and color Doppler ultrasound evaluation of the kidneys and urinary bladder was performed. Findings: The left kidney is absent. There is mild to moderate right-sided hydronephrosis noted. No shadowing calculus is identified. No right renal cysts or masses identified. There is normal cortical echogenicity and thickness. Right kidney measures 13 x 7 x 5.6  cm. Urinary bladder is grossly unremarkable.     Impression: Impression: 1.Mild to moderate right-sided hydronephrosis. The reason for this is not sonographically apparent. 2.Status post left nephrectomy. Electronically Signed: Chilo Luciano MD  3/17/2025 6:06 PM EDT  Workstation ID: [id number]       Most notable findings include: As above.    Assessment & Plan   Assessment / Plan     Brief Patient Summary:  Katherine Rodriguez is a 44 y.o. female who has metastatic renal cell cancer, status post left nephrectomy now with acute kidney injury, mild to moderate right hydronephrosis and a creatinine of 5.  Right ureteral stent removed in February.    Active Hospital Problems:  Active Hospital Problems    Diagnosis    • **GURINDER (acute kidney injury)    • Solitary kidney, acquired    • Other hydronephrosis    • Hydronephrosis    • Renal cell carcinoma of  left kidney        Assessment and Plan:   - Acute kidney injury most likely secondary to obstructive uropathy with solitary kidney, recommended urology evaluation, discussed with primary earlier today.  Her right ureteral stent was removed in February.  PVR 35 cc.  Lisinopril/hydrochlorothiazide on hold.  I was notified that CT scan is being ordered.  Will follow-up on results.  - Metastatic renal cell cancer with renal vein and IVC thrombus, status post surgery in December 2024.  Now receiving Keytruda per oncology.  - Anemia, severe, check iron studies, no evidence of bleeding, transfuse if needed.    Discussed with primary and nursing staff.  Will follow, please call with any questions.    Thank you very much for this consult!    Electronically signed by Vimal Rivers MD, 3/20/2025, 15:52 EDT.

## 2025-03-21 VITALS
DIASTOLIC BLOOD PRESSURE: 89 MMHG | RESPIRATION RATE: 18 BRPM | SYSTOLIC BLOOD PRESSURE: 142 MMHG | HEART RATE: 87 BPM | HEIGHT: 63 IN | WEIGHT: 171.3 LBS | TEMPERATURE: 98.1 F | OXYGEN SATURATION: 99 % | BODY MASS INDEX: 30.35 KG/M2

## 2025-03-21 LAB
ALBUMIN SERPL-MCNC: 3.6 G/DL (ref 3.5–5.2)
ALBUMIN/GLOB SERPL: 0.9 G/DL
ALP SERPL-CCNC: 100 U/L (ref 39–117)
ALT SERPL W P-5'-P-CCNC: <5 U/L (ref 1–33)
ANION GAP SERPL CALCULATED.3IONS-SCNC: 13 MMOL/L (ref 5–15)
AST SERPL-CCNC: 8 U/L (ref 1–32)
B-HCG UR QL: NEGATIVE
BILIRUB SERPL-MCNC: 0.2 MG/DL (ref 0–1.2)
BUN SERPL-MCNC: 33 MG/DL (ref 6–20)
BUN/CREAT SERPL: 8.6 (ref 7–25)
CALCIUM SPEC-SCNC: 9.2 MG/DL (ref 8.6–10.5)
CHLORIDE SERPL-SCNC: 104 MMOL/L (ref 98–107)
CO2 SERPL-SCNC: 21 MMOL/L (ref 22–29)
CREAT SERPL-MCNC: 3.83 MG/DL (ref 0.57–1)
DEPRECATED RDW RBC AUTO: 46.6 FL (ref 37–54)
EGFRCR SERPLBLD CKD-EPI 2021: 14.2 ML/MIN/1.73
ERYTHROCYTE [DISTWIDTH] IN BLOOD BY AUTOMATED COUNT: 14.6 % (ref 12.3–15.4)
FERRITIN SERPL-MCNC: 266.7 NG/ML (ref 13–150)
GLOBULIN UR ELPH-MCNC: 3.9 GM/DL
GLUCOSE SERPL-MCNC: 105 MG/DL (ref 65–99)
HCT VFR BLD AUTO: 22.7 % (ref 34–46.6)
HGB BLD-MCNC: 7.6 G/DL (ref 12–15.9)
IRON 24H UR-MRATE: 77 MCG/DL (ref 37–145)
IRON SATN MFR SERPL: 27 % (ref 20–50)
MAGNESIUM SERPL-MCNC: 1.4 MG/DL (ref 1.6–2.6)
MCH RBC QN AUTO: 29.6 PG (ref 26.6–33)
MCHC RBC AUTO-ENTMCNC: 33.5 G/DL (ref 31.5–35.7)
MCV RBC AUTO: 88.3 FL (ref 79–97)
PHOSPHATE SERPL-MCNC: 3.8 MG/DL (ref 2.5–4.5)
PLATELET # BLD AUTO: 271 10*3/MM3 (ref 140–450)
PMV BLD AUTO: 9.8 FL (ref 6–12)
POTASSIUM SERPL-SCNC: 4.8 MMOL/L (ref 3.5–5.2)
PROT SERPL-MCNC: 7.5 G/DL (ref 6–8.5)
RBC # BLD AUTO: 2.57 10*6/MM3 (ref 3.77–5.28)
SODIUM SERPL-SCNC: 138 MMOL/L (ref 136–145)
TIBC SERPL-MCNC: 288 MCG/DL (ref 298–536)
TRANSFERRIN SERPL-MCNC: 193 MG/DL (ref 200–360)
WBC NRBC COR # BLD AUTO: 4.8 10*3/MM3 (ref 3.4–10.8)

## 2025-03-21 PROCEDURE — 84100 ASSAY OF PHOSPHORUS: CPT | Performed by: UROLOGY

## 2025-03-21 PROCEDURE — 80053 COMPREHEN METABOLIC PANEL: CPT | Performed by: UROLOGY

## 2025-03-21 PROCEDURE — 99231 SBSQ HOSP IP/OBS SF/LOW 25: CPT | Performed by: UROLOGY

## 2025-03-21 PROCEDURE — 83735 ASSAY OF MAGNESIUM: CPT | Performed by: UROLOGY

## 2025-03-21 PROCEDURE — 84466 ASSAY OF TRANSFERRIN: CPT | Performed by: UROLOGY

## 2025-03-21 PROCEDURE — 82728 ASSAY OF FERRITIN: CPT | Performed by: UROLOGY

## 2025-03-21 PROCEDURE — 85027 COMPLETE CBC AUTOMATED: CPT | Performed by: UROLOGY

## 2025-03-21 PROCEDURE — 25010000002 MAGNESIUM SULFATE 2 GM/50ML SOLUTION: Performed by: INTERNAL MEDICINE

## 2025-03-21 PROCEDURE — 81025 URINE PREGNANCY TEST: CPT | Performed by: UROLOGY

## 2025-03-21 PROCEDURE — 99239 HOSP IP/OBS DSCHRG MGMT >30: CPT | Performed by: INTERNAL MEDICINE

## 2025-03-21 PROCEDURE — 83540 ASSAY OF IRON: CPT | Performed by: UROLOGY

## 2025-03-21 RX ORDER — MAGNESIUM SULFATE HEPTAHYDRATE 40 MG/ML
2 INJECTION, SOLUTION INTRAVENOUS ONCE
Status: COMPLETED | OUTPATIENT
Start: 2025-03-21 | End: 2025-03-21

## 2025-03-21 RX ORDER — LISINOPRIL 20 MG/1
10 TABLET ORAL DAILY
Status: CANCELLED
Start: 2025-03-21

## 2025-03-21 RX ADMIN — AMLODIPINE BESYLATE 10 MG: 10 TABLET ORAL at 08:59

## 2025-03-21 RX ADMIN — MAGNESIUM SULFATE HEPTAHYDRATE 2 G: 40 INJECTION, SOLUTION INTRAVENOUS at 08:59

## 2025-03-21 RX ADMIN — Medication 10 ML: at 08:59

## 2025-03-21 RX ADMIN — BUPROPION HYDROCHLORIDE 150 MG: 150 TABLET, EXTENDED RELEASE ORAL at 08:59

## 2025-03-21 NOTE — DISCHARGE SUMMARY
[facility]         HOSPITALIST  DISCHARGE SUMMARY    Patient Name: Katherine Rodriguez  : 1980  MRN: [id number]    Date of Admission: 3/19/2025  Date of Discharge:  25  Primary Care Physician: Cecily Nicholas APRN    Consults       Date and Time Order Name Status Description    3/20/2025 10:12 AM Inpatient Urology Consult Completed     3/19/2025 10:04 PM Inpatient Nephrology Consult Completed             Active and Resolved Hospital Problems:  Acute kidney injury  Obstructive uropathy  Right hydronephrosis  Metabolic acidosis  History of left metastatic renal cell carcinoma status post nephrectomy on Pembrolizumab  History of right ureter injury  Hypomagnesemia  Chronic anemia.  No evidence of bleeding  Essential hypertension    Hospital Course     Hospital Course:  Katherine Rodriguez is a 44 y.o. female with history of renal cell carcinoma status post left nephrectomy, chronic anemia, hypertension, admitted after outpatient labs showed worsening renal function.  Creatinine 5.34 with baseline creatinine around 2.2.  BUN 46, bicarb normal, anion gap 15, PVR low.  Lisinopril/HCTZ stopped.  Recent renal ultrasound reported mild to moderate right-sided hydronephrosis which was confirmed on CT scan.  Urology consulted.  Underwent right-sided ureteral stent placement without complication.  Creatinine improved to 3.83.  Passed voiding trial.  Patient was feeling well, and stable vitals and wanted to go home.  Educated on medication changes, follow-up with her regular urologist scheduled 3/25/2025.  Discharged home in stable condition.    DISCHARGE Follow Up Recommendations for labs and diagnostics:   BMP 1 week    Day of Discharge     Vital Signs:  Temp:  [97.5 °F (36.4 °C)-98.1 °F (36.7 °C)] 98.1 °F (36.7 °C)  Heart Rate:  [84-90] 87  Resp:  [16-20] 18  BP: (130-158)/(76-91) 142/89  Physical Exam:   GENERAL: conversant and nontoxic.  HEART: No edema  LUNGS: nonlabored  ABDOMEN: Soft,  nondistended  NEUROLOGIC: Alert, CN intact    Discharge Details        Discharge Medications        Continue These Medications        Instructions Start Date   amLODIPine 10 MG tablet  Commonly known as: NORVASC   10 mg, Daily      buPROPion  MG 12 hr tablet  Commonly known as: WELLBUTRIN SR   150 mg, Daily      Eliquis 5 MG tablet tablet  Generic drug: apixaban   5 mg, 2 Times Daily      ferrous gluconate 240 (27 FE) MG tablet  Commonly known as: FERGON   240 mg, Every Other Day      folic acid 1 MG tablet  Commonly known as: FOLVITE   1 mg, Oral, Daily      Vitamin D 50 MCG (2000 UT) capsule   1 capsule, Daily             Stop These Medications      hydroCHLOROthiazide 25 MG tablet     lisinopril 20 MG tablet  Commonly known as: PRINIVIL,ZESTRIL     ondansetron 8 MG tablet  Commonly known as: ZOFRAN              No Known Allergies    Discharge Disposition:  Home or Self Care    Diet:  Hospital:No active diet order      Discharge Activity:   Activity Instructions       Activity as Tolerated              CODE STATUS:  Code Status and Medical Interventions: CPR (Attempt to Resuscitate); Full Support   Ordered at: 03/19/25 1946     Code Status (Patient has no pulse and is not breathing):    CPR (Attempt to Resuscitate)     Medical Interventions (Patient has pulse or is breathing):    Full Support         Future Appointments   Date Time Provider Department Center   4/14/2025  8:30 AM [facility] INFUSION LAB [facility] OPIF [facility]   4/14/2025  9:00 AM Guy Layne MD [facility] ONC ETWN [facility]   4/14/2025  9:30 AM CHAIR 15 [facility] OP INFU [facility] OPIF [facility]   5/5/2025  8:00 AM  [facility] INFUSION LAB [facility] OPIF [facility]   5/5/2025  8:30 AM Guy Layne MD [facility] ONC ETWN [facility]   5/5/2025  9:00 AM CHAIR 11 [facility]       Additional Instructions for the Follow-ups that You Need to Schedule       Discharge Follow-up with Specified Provider: Sarkis Peters   As directed      To: Sarkis Peters   Follow Up Details:  3/25/2025        Discharge Follow-up with Specified Provider: Layla Nicholas; 1 Week   As directed      To: Layla Nicholas   Follow Up: 1 Week                Pertinent  and/or Most Recent Results     PROCEDURES:   Cystoscopy, right retrograde pyelogram, ureteral stent insertion     LAB RESULTS:      Lab 03/21/25 0523 03/20/25 0617 03/19/25  1327 03/17/25  0930   WBC 4.80 5.11 6.57 5.72   HEMOGLOBIN 7.6* 7.6* 7.4* 8.5*   HEMATOCRIT 22.7* 23.2* 22.7* 25.8*   PLATELETS 271 241 277 306   NEUTROS ABS  --  3.39 4.81 4.04   IMMATURE GRANS (ABS)  --  0.02 0.02 0.04   LYMPHS ABS  --  1.15 1.17 1.16   MONOS ABS  --  0.37 0.35 0.25   EOS ABS  --  0.15 0.18 0.19   MCV 88.3 88.5 89.4 91.2         Lab 03/21/25 0523 03/20/25 0617 03/19/25  1327 03/17/25  0930   SODIUM 138 135*  135* 137 139   POTASSIUM 4.8 4.1  4.1 4.0 4.0   CHLORIDE 104 102  102 100 100   CO2 21.0* 20.3*  21.0* 22.5 23.7   ANION GAP 13.0 12.7  12.0 14.5 15.3*   BUN 33* 37*  35* 38* 46*   CREATININE 3.83* 4.15*  4.15* 4.90* 5.34*   EGFR 14.2* 12.9*  12.9* 10.6* 9.6*   GLUCOSE 105* 84  86 94 92   CALCIUM 9.2 9.2  9.1 10.1 9.6   MAGNESIUM 1.4*  --   --   --    PHOSPHORUS 3.8 4.9*  --   --          Lab 03/21/25 0523 03/20/25 0617 03/19/25  1327 03/17/25  0930   TOTAL PROTEIN 7.5 7.0 8.5 8.7*   ALBUMIN 3.6 3.4*  3.4* 4.1 4.3   GLOBULIN 3.9 3.6 4.4 4.4   ALT (SGPT) <5 <5 <5 <5   AST (SGOT) 8 6 8 9   BILIRUBIN 0.2 0.2 0.2 0.2   ALK PHOS 100 99 115 116                 Lab 03/21/25  0523   IRON 77   IRON SATURATION (TSAT) 27   TIBC 288*   TRANSFERRIN 193*   FERRITIN 266.70*         Brief Urine Lab Results  (Last result in the past 365 days)        Color   Clarity   Blood   Leuk Est   Nitrite   Protein   CREAT   Urine HCG        03/21/25 1044               Negative             Microbiology Results (last 10 days)       ** No results found for the last 240 hours. **            CT Abdomen Pelvis Without Contrast  Result Date: 3/20/2025  Impression: 1. Interval  left nephrectomy. 2. Moderate right-sided hydronephrosis. This appears to be related to an inflammatory or postoperative change within the right lower pelvis where there is fat stranding and small amounts of fluid. No discrete abscess identified. Given the presence of hydronephrosis in a solitary kidney, urology consultation would be recommended. 3. Borderline size retroperitoneal nodes and new mildly enlarged bilateral external iliac nodes. These may be reactive in etiology given the findings in the abdomen and pelvis. Metastatic disease cannot be entirely excluded and repeat CT scan would be recommended in 3 months. Electronically Signed: Thaddeus Fleming MD  3/20/2025 1:16 PM EDT  Workstation ID: [id number]    US Renal Limited  Result Date: 3/17/2025  Impression: 1.Mild to moderate right-sided hydronephrosis. The reason for this is not sonographically apparent. 2.Status post left nephrectomy. Electronically Signed: Chilo Luciano MD  3/17/2025 6:06 PM EDT  Workstation ID: [id number]         Time spent on Discharge including face to face service:  >30 minutes    Electronically signed by Ryley Tanner DO, 03/21/25, 7:41 PM EDT.

## 2025-03-21 NOTE — PLAN OF CARE
Goal Outcome Evaluation:  Plan of Care Reviewed With: patient        Progress: improving  Outcome Evaluation: Pt a/ox4. Recovering well following cystoscopy of right kidney to relieve obstruction and place stent. interiano placed by urology and draining clear urine. No c/o of pain or discomfort. Rested well during shift. Continue with poc.

## 2025-03-21 NOTE — PROGRESS NOTES
[facility]   Urology Progress Note    Patient Name: Katherine Rodriguez  : 1980  MRN: [id number]  Primary Care Physician:  Cecily Nicholas APRN  Date of admission: 3/19/2025    Subjective   Subjective       No complaints; denies significant flank pain    Objective   Objective     Vitals:   Temp:  [97.4 °F (36.3 °C)-98.1 °F (36.7 °C)] 98 °F (36.7 °C)  Heart Rate:  [] 89  Resp:  [14-20] 18  BP: (112-149)/(75-96) 139/91  Physical Exam     Alert and oriented x3  No acute distress  Unlabored respirations  Nontender/nondistended   Darby in place, clear      Result Review    Result Review:  I have personally;  the results from the time of this admission to 3/21/2025 06:41 EDT and agree with these findings:  [x]  Laboratory  []  Microbiology  []  Radiology  []  EKG/Telemetry   []  Cardiology/Vascular   []  Pathology  []  Old records  []  Other:      Assessment & Plan   Assessment / Plan     Brief Patient Summary:  Katherine Rodriguez is a 44 y.o. female is post right ureteral stent insertion 6 x 26 black beauty stent    Active Hospital Problems:  Active Hospital Problems    Diagnosis     **GURINDER (acute kidney injury)     Solitary kidney, acquired     Other hydronephrosis     Hydronephrosis     Renal cell carcinoma of left kidney      Plan:   Denies urologic complaint  Labs reviewed; creatinine now 3.83 from 4.15    Maintain right ureteral stent    Void trial    Okay for discharge from urology standpoint once appropriate per primary service    Follow-up with [facility] urology, Dr. Say Deal aware of patient admission and events; patient encouraged to call his office upon discharge    Will sign off, please call with questions      Electronically signed by Veronica Lawrence MD, 25, 6:41 AM EDT.

## 2025-03-21 NOTE — PROGRESS NOTES
[facility]     Nephrology Progress Note      Patient Name: Katherine Rodriguez  : 1980  MRN: [id number]  Primary Care Physician:  Cecily Nicholas APRN  Date of admission: 3/19/2025    Subjective   Subjective     Interval History:  Patient Reports feeling better.  Events noted.  Status post cystoscopy and right ureteral stent insertion.  Good urine output.  Darby catheter is out, voiding well.  Eating well.    Review of Systems   All systems were reviewed and negative except for: What is mentioned above.    Objective   Objective     Vitals:   Temp:  [97.4 °F (36.3 °C)-98.1 °F (36.7 °C)] 98.1 °F (36.7 °C)  Heart Rate:  [] 87  Resp:  [14-20] 18  BP: (112-158)/(75-96) 142/89  Physical Exam:   Constitutional: Awake, alert   Eyes: sclerae anicteric, no conjunctival injection   HENT: mucous membranes moist   Neck: Supple, no thyromegaly, no lymphadenopathy, trachea midline, No JVD   Respiratory: Clear to auscultation bilaterally, nonlabored respirations    Cardiovascular: RRR, no murmurs, rubs, or gallops.   Gastrointestinal: Positive bowel sounds, soft, nontender, nondistended   Musculoskeletal: No edema, no clubbing or cyanosis   Psychiatric: Appropriate affect, cooperative   Neurologic: Oriented x 3, moving all extremities, Cranial Nerves grossly intact, speech clear   Skin: warm and dry, no rashes     Result Review    Result Reviewed:  I have personally reviewed the results from the time of this admission to 3/21/2025 15:34 EDT and agree with these findings:  [x]  Laboratory  []  Microbiology  [x]  Radiology  []  EKG/Telemetry   []  Cardiology/Vascular   []  Pathology  [x]  Old records  []  Other:        Lab 25  0523 25  0617 25  1327 25  0930   SODIUM 138 135*  135* 137 139   POTASSIUM 4.8 4.1  4.1 4.0 4.0   CHLORIDE 104 102  102 100 100   CO2 21.0* 20.3*  21.0* 22.5 23.7   BUN 33* 37*  35* 38* 46*   CREATININE 3.83* 4.15*  4.15* 4.90* 5.34*   GLUCOSE 105* 84  86 94  92   EGFR 14.2* 12.9*  12.9* 10.6* 9.6*   ANION GAP 13.0 12.7  12.0 14.5 15.3*   MAGNESIUM 1.4*  --   --   --    PHOSPHORUS 3.8 4.9*  --   --      US Renal Limited  Result Date: 3/17/2025  US RENAL LIMITED Date of Exam: 3/17/2025 12:56 PM EDT Indication: elevated creatinine. Comparison: No comparisons available. Technique: Grayscale and color Doppler ultrasound evaluation of the kidneys and urinary bladder was performed. Findings: The left kidney is absent. There is mild to moderate right-sided hydronephrosis noted. No shadowing calculus is identified. No right renal cysts or masses identified. There is normal cortical echogenicity and thickness. Right kidney measures 13 x 7 x 5.6  cm. Urinary bladder is grossly unremarkable.     Impression: Impression: 1.Mild to moderate right-sided hydronephrosis. The reason for this is not sonographically apparent. 2.Status post left nephrectomy. Electronically Signed: Chilo Luciano MD  3/17/2025 6:06 PM EDT  Workstation ID: [id number]       Most notable findings include: As above.    Assessment & Plan   Assessment / Plan       Active Hospital Problems:  Active Hospital Problems    Diagnosis     **GURINDER (acute kidney injury)     Solitary kidney, acquired     Other hydronephrosis     Hydronephrosis     Renal cell carcinoma of left kidney        Assessment and Plan:    - Acute kidney injury most likely secondary to obstructive uropathy with solitary kidney, recommended urology evaluation, discussed with primary earlier today.  Her right ureteral stent was removed in February.  PVR 35 cc.  Avoid HCTZ and lisinopril at this time.  Okay for amlodipine if needed.  Monitor blood pressure at home.  Status post cystoscopy and right ureteral stent insertion.  Now voiding well, Darby is out.  Clinically stable.  Creatinine slowly improving.  Patient will have labs next week at the cancer center.  She has follow-up in our office in mid April.  She has follow-up with urology at [facility]  [facility] soon.  I appreciate urology help.  - Metastatic renal cell cancer with renal vein and IVC thrombus, status post surgery in December 2024.  Now receiving Keytruda per oncology.  - Anemia, severe, check iron studies, no evidence of bleeding, transfuse if needed.  - Hypomagnesemia, replaced.    No objection for discharge from renal standpoint.    Discussed with patient follow-up after discharge.  Please call with any questions!      Electronically signed by Vimal Rivers MD, 3/21/2025, 15:34 EDT.

## 2025-03-22 ENCOUNTER — READMISSION MANAGEMENT (OUTPATIENT)
Dept: CALL CENTER | Facility: HOSPITAL | Age: 45
End: 2025-03-22
Payer: COMMERCIAL

## 2025-03-22 NOTE — OUTREACH NOTE
Prep Survey      Flowsheet Row Responses   [facility] facility patient discharged from? [facility]   Is LACE score < 7 ? No   Eligibility Readm Mgmt   Discharge diagnosis CYSTOSCOPY RETROGRADE PYELOGRAM AND STENT INSERTION, right  Plain text: Scope bladder, shoot x-ray, place stent, right   Does the patient have one of the following disease processes/diagnoses(primary or secondary)? Other   Prep survey completed? Yes            Mirian HANSON - Registered Nurse

## 2025-03-24 ENCOUNTER — TELEPHONE (OUTPATIENT)
Dept: ONCOLOGY | Facility: HOSPITAL | Age: 45
End: 2025-03-24
Payer: COMMERCIAL

## 2025-03-24 DIAGNOSIS — N17.9 AKI (ACUTE KIDNEY INJURY): Primary | ICD-10-CM

## 2025-03-24 DIAGNOSIS — C64.2 RENAL CELL CARCINOMA OF LEFT KIDNEY: ICD-10-CM

## 2025-03-24 NOTE — TELEPHONE ENCOUNTER
LM for patient: Dr. Layne does not need to see her, but would like for her to get her labs rechecked this Friday.  Instructed patient to call back for any questions, or if she needs a lab appointment here.

## 2025-03-24 NOTE — TELEPHONE ENCOUNTER
Caller: Katherine Rodriguez    Relationship: Self    Best call back number: [phone]      Who are you requesting to speak with (clinical staff, provider,  specific staff member): CLINICAL      What was the call regarding: PT WAS DISCHARGED FROM HOSPITAL ON 3/21 AND ASKING IF SHE NEEDS TO BE SEEN PRIOR TO HER APPT ON 4/14.    PLEASE ADVISE

## 2025-03-26 ENCOUNTER — READMISSION MANAGEMENT (OUTPATIENT)
Dept: CALL CENTER | Facility: HOSPITAL | Age: 45
End: 2025-03-26
Payer: COMMERCIAL

## 2025-03-26 NOTE — OUTREACH NOTE
Medical Week 1 Survey      Flowsheet Row Responses   [facility] patient discharged from? [facility]   Does the patient have one of the following disease processes/diagnoses(primary or secondary)? Other   Week 1 attempt successful? Yes   Call start time 1623   Call end time 1627   List who call center can speak with pt   Meds reviewed with patient/caregiver? Yes   Is the patient having any side effects they believe may be caused by any medication additions or changes? No   Does the patient have all medications ordered at discharge? Yes   Is the patient taking all medications as directed (includes completed medication regime)? Yes   Comments regarding appointments Pt to see nephrology on 3-.   Does the patient have a primary care provider?  Yes   Has the patient kept scheduled appointments due by today? N/A   Has home health visited the patient within 72 hours of discharge? N/A   Psychosocial issues? No   Did the patient receive a copy of their discharge instructions? Yes   What is the patient's perception of their health status since discharge? Improving   Is the patient/caregiver able to teach back signs and symptoms related to disease process for when to call PCP? Yes   Is the patient/caregiver able to teach back signs and symptoms related to disease process for when to call 911? Yes   Is the patient/caregiver able to teach back the hierarchy of who to call/visit for symptoms/problems? PCP, Specialist, Home health nurse, Urgent Care, ED, 911 Yes   Week 1 call completed? Yes   Is the patient interested in additional calls from an ambulatory ? No   Wrap up additional comments Pt reports improving. Pt has no pain. Pt has all medications . Pt to see nephrology on 3-. Pt has returned to work.   Call end time 1627            Mirian ONEIL - Registered Nurse

## 2025-03-28 ENCOUNTER — LAB (OUTPATIENT)
Dept: ONCOLOGY | Facility: HOSPITAL | Age: 45
End: 2025-03-28
Payer: COMMERCIAL

## 2025-03-28 DIAGNOSIS — N17.9 AKI (ACUTE KIDNEY INJURY): ICD-10-CM

## 2025-03-28 DIAGNOSIS — C64.2 RENAL CELL CARCINOMA OF LEFT KIDNEY: ICD-10-CM

## 2025-03-28 LAB
ALBUMIN SERPL-MCNC: 4.4 G/DL (ref 3.5–5.2)
ALBUMIN/GLOB SERPL: 1.1 G/DL
ALP SERPL-CCNC: 107 U/L (ref 39–117)
ALT SERPL W P-5'-P-CCNC: 5 U/L (ref 1–33)
ANION GAP SERPL CALCULATED.3IONS-SCNC: 14.4 MMOL/L (ref 5–15)
AST SERPL-CCNC: 8 U/L (ref 1–32)
BASOPHILS # BLD AUTO: 0.03 10*3/MM3 (ref 0–0.2)
BASOPHILS NFR BLD AUTO: 0.4 % (ref 0–1.5)
BILIRUB SERPL-MCNC: <0.2 MG/DL (ref 0–1.2)
BUN SERPL-MCNC: 36 MG/DL (ref 6–20)
BUN/CREAT SERPL: 13.1 (ref 7–25)
CALCIUM SPEC-SCNC: 9.7 MG/DL (ref 8.6–10.5)
CHLORIDE SERPL-SCNC: 99 MMOL/L (ref 98–107)
CO2 SERPL-SCNC: 20.6 MMOL/L (ref 22–29)
CREAT SERPL-MCNC: 2.75 MG/DL (ref 0.57–1)
DEPRECATED RDW RBC AUTO: 51.1 FL (ref 37–54)
EGFRCR SERPLBLD CKD-EPI 2021: 21.2 ML/MIN/1.73
EOSINOPHIL # BLD AUTO: 0.2 10*3/MM3 (ref 0–0.4)
EOSINOPHIL NFR BLD AUTO: 2.7 % (ref 0.3–6.2)
ERYTHROCYTE [DISTWIDTH] IN BLOOD BY AUTOMATED COUNT: 15.2 % (ref 12.3–15.4)
GLOBULIN UR ELPH-MCNC: 4.1 GM/DL
GLUCOSE SERPL-MCNC: 97 MG/DL (ref 65–99)
HCT VFR BLD AUTO: 25.7 % (ref 34–46.6)
HGB BLD-MCNC: 8.4 G/DL (ref 12–15.9)
IMM GRANULOCYTES # BLD AUTO: 0.06 10*3/MM3 (ref 0–0.05)
IMM GRANULOCYTES NFR BLD AUTO: 0.8 % (ref 0–0.5)
LYMPHOCYTES # BLD AUTO: 1.52 10*3/MM3 (ref 0.7–3.1)
LYMPHOCYTES NFR BLD AUTO: 20.3 % (ref 19.6–45.3)
MCH RBC QN AUTO: 30.2 PG (ref 26.6–33)
MCHC RBC AUTO-ENTMCNC: 32.7 G/DL (ref 31.5–35.7)
MCV RBC AUTO: 92.4 FL (ref 79–97)
MONOCYTES # BLD AUTO: 0.48 10*3/MM3 (ref 0.1–0.9)
MONOCYTES NFR BLD AUTO: 6.4 % (ref 5–12)
NEUTROPHILS NFR BLD AUTO: 5.19 10*3/MM3 (ref 1.7–7)
NEUTROPHILS NFR BLD AUTO: 69.4 % (ref 42.7–76)
NRBC BLD AUTO-RTO: 0 /100 WBC (ref 0–0.2)
PLATELET # BLD AUTO: 293 10*3/MM3 (ref 140–450)
PMV BLD AUTO: 9.5 FL (ref 6–12)
POTASSIUM SERPL-SCNC: 4.5 MMOL/L (ref 3.5–5.2)
PROT SERPL-MCNC: 8.5 G/DL (ref 6–8.5)
RBC # BLD AUTO: 2.78 10*6/MM3 (ref 3.77–5.28)
SODIUM SERPL-SCNC: 134 MMOL/L (ref 136–145)
WBC NRBC COR # BLD AUTO: 7.48 10*3/MM3 (ref 3.4–10.8)

## 2025-03-28 PROCEDURE — 36415 COLL VENOUS BLD VENIPUNCTURE: CPT

## 2025-03-28 PROCEDURE — 85025 COMPLETE CBC W/AUTO DIFF WBC: CPT

## 2025-03-28 PROCEDURE — 80053 COMPREHEN METABOLIC PANEL: CPT

## 2025-04-08 ENCOUNTER — READMISSION MANAGEMENT (OUTPATIENT)
Dept: CALL CENTER | Facility: HOSPITAL | Age: 45
End: 2025-04-08
Payer: COMMERCIAL

## 2025-04-08 NOTE — OUTREACH NOTE
Medical Week 2 Survey      Flowsheet Row Responses   [facility] patient discharged from? [facility]   Does the patient have one of the following disease processes/diagnoses(primary or secondary)? Other   Week 2 attempt successful? No   Unsuccessful attempts Attempt 1   Daniel Argueta Registered Nurse

## 2025-04-14 ENCOUNTER — OFFICE VISIT (OUTPATIENT)
Dept: ONCOLOGY | Facility: HOSPITAL | Age: 45
End: 2025-04-14
Payer: COMMERCIAL

## 2025-04-14 ENCOUNTER — HOSPITAL ENCOUNTER (OUTPATIENT)
Dept: ONCOLOGY | Facility: HOSPITAL | Age: 45
Discharge: HOME | End: 2025-04-14
Payer: COMMERCIAL

## 2025-04-14 VITALS
HEART RATE: 103 BPM | DIASTOLIC BLOOD PRESSURE: 82 MMHG | RESPIRATION RATE: 18 BRPM | WEIGHT: 162.48 LBS | BODY MASS INDEX: 28.79 KG/M2 | OXYGEN SATURATION: 100 % | HEIGHT: 63 IN | TEMPERATURE: 97.6 F | SYSTOLIC BLOOD PRESSURE: 126 MMHG

## 2025-04-14 VITALS
WEIGHT: 162.48 LBS | BODY MASS INDEX: 28.79 KG/M2 | RESPIRATION RATE: 18 BRPM | OXYGEN SATURATION: 100 % | HEART RATE: 103 BPM | TEMPERATURE: 97.6 F | SYSTOLIC BLOOD PRESSURE: 126 MMHG | HEIGHT: 63 IN | DIASTOLIC BLOOD PRESSURE: 82 MMHG

## 2025-04-14 DIAGNOSIS — C64.2 RENAL CELL CARCINOMA OF LEFT KIDNEY: Primary | ICD-10-CM

## 2025-04-14 DIAGNOSIS — N18.4 STAGE 4 CHRONIC KIDNEY DISEASE: ICD-10-CM

## 2025-04-14 DIAGNOSIS — D63.1 ANEMIA IN STAGE 4 CHRONIC KIDNEY DISEASE: ICD-10-CM

## 2025-04-14 DIAGNOSIS — N92.1 PROLONGED MENSTRUAL CYCLE: ICD-10-CM

## 2025-04-14 DIAGNOSIS — N18.4 ANEMIA IN STAGE 4 CHRONIC KIDNEY DISEASE: ICD-10-CM

## 2025-04-14 DIAGNOSIS — Z79.899 ENCOUNTER FOR LONG-TERM CURRENT USE OF HIGH RISK MEDICATION: ICD-10-CM

## 2025-04-14 LAB
ALBUMIN SERPL-MCNC: 4.3 G/DL (ref 3.5–5.2)
ALBUMIN/GLOB SERPL: 1 G/DL
ALP SERPL-CCNC: 135 U/L (ref 39–117)
ALT SERPL W P-5'-P-CCNC: <5 U/L (ref 1–33)
ANION GAP SERPL CALCULATED.3IONS-SCNC: 14.1 MMOL/L (ref 5–15)
AST SERPL-CCNC: 10 U/L (ref 1–32)
BASOPHILS # BLD AUTO: 0.05 10*3/MM3 (ref 0–0.2)
BASOPHILS NFR BLD AUTO: 0.7 % (ref 0–1.5)
BILIRUB SERPL-MCNC: 0.2 MG/DL (ref 0–1.2)
BUN SERPL-MCNC: 23 MG/DL (ref 6–20)
BUN/CREAT SERPL: 9.8 (ref 7–25)
CALCIUM SPEC-SCNC: 10.1 MG/DL (ref 8.6–10.5)
CHLORIDE SERPL-SCNC: 98 MMOL/L (ref 98–107)
CO2 SERPL-SCNC: 22.9 MMOL/L (ref 22–29)
CREAT SERPL-MCNC: 2.35 MG/DL (ref 0.57–1)
DEPRECATED RDW RBC AUTO: 56.2 FL (ref 37–54)
EGFRCR SERPLBLD CKD-EPI 2021: 25.6 ML/MIN/1.73
EOSINOPHIL # BLD AUTO: 0.25 10*3/MM3 (ref 0–0.4)
EOSINOPHIL NFR BLD AUTO: 3.6 % (ref 0.3–6.2)
ERYTHROCYTE [DISTWIDTH] IN BLOOD BY AUTOMATED COUNT: 16.5 % (ref 12.3–15.4)
GLOBULIN UR ELPH-MCNC: 4.4 GM/DL
GLUCOSE SERPL-MCNC: 81 MG/DL (ref 65–99)
HCT VFR BLD AUTO: 26.1 % (ref 34–46.6)
HGB BLD-MCNC: 8.4 G/DL (ref 12–15.9)
IMM GRANULOCYTES # BLD AUTO: 0.04 10*3/MM3 (ref 0–0.05)
IMM GRANULOCYTES NFR BLD AUTO: 0.6 % (ref 0–0.5)
LYMPHOCYTES # BLD AUTO: 1.23 10*3/MM3 (ref 0.7–3.1)
LYMPHOCYTES NFR BLD AUTO: 17.6 % (ref 19.6–45.3)
MCH RBC QN AUTO: 30.3 PG (ref 26.6–33)
MCHC RBC AUTO-ENTMCNC: 32.2 G/DL (ref 31.5–35.7)
MCV RBC AUTO: 94.2 FL (ref 79–97)
MONOCYTES # BLD AUTO: 0.39 10*3/MM3 (ref 0.1–0.9)
MONOCYTES NFR BLD AUTO: 5.6 % (ref 5–12)
NEUTROPHILS NFR BLD AUTO: 5.01 10*3/MM3 (ref 1.7–7)
NEUTROPHILS NFR BLD AUTO: 71.9 % (ref 42.7–76)
NRBC BLD AUTO-RTO: 0 /100 WBC (ref 0–0.2)
PLATELET # BLD AUTO: 335 10*3/MM3 (ref 140–450)
PMV BLD AUTO: 9.4 FL (ref 6–12)
POTASSIUM SERPL-SCNC: 4.1 MMOL/L (ref 3.5–5.2)
PROT SERPL-MCNC: 8.7 G/DL (ref 6–8.5)
RBC # BLD AUTO: 2.77 10*6/MM3 (ref 3.77–5.28)
SODIUM SERPL-SCNC: 135 MMOL/L (ref 136–145)
T4 FREE SERPL-MCNC: 1.49 NG/DL (ref 0.92–1.68)
TSH SERPL DL<=0.05 MIU/L-ACNC: 2.14 UIU/ML (ref 0.27–4.2)
WBC NRBC COR # BLD AUTO: 6.97 10*3/MM3 (ref 3.4–10.8)

## 2025-04-14 PROCEDURE — 84439 ASSAY OF FREE THYROXINE: CPT | Performed by: INTERNAL MEDICINE

## 2025-04-14 PROCEDURE — 25010000002 PEMBROLIZUMAB 100 MG/4ML SOLUTION 4 ML VIAL: Performed by: INTERNAL MEDICINE

## 2025-04-14 PROCEDURE — 25810000003 SODIUM CHLORIDE 0.9 % SOLUTION: Performed by: INTERNAL MEDICINE

## 2025-04-14 PROCEDURE — 99214 OFFICE O/P EST MOD 30 MIN: CPT | Performed by: INTERNAL MEDICINE

## 2025-04-14 PROCEDURE — 80053 COMPREHEN METABOLIC PANEL: CPT | Performed by: INTERNAL MEDICINE

## 2025-04-14 PROCEDURE — 96413 CHEMO IV INFUSION 1 HR: CPT

## 2025-04-14 PROCEDURE — 85025 COMPLETE CBC W/AUTO DIFF WBC: CPT | Performed by: INTERNAL MEDICINE

## 2025-04-14 PROCEDURE — 84443 ASSAY THYROID STIM HORMONE: CPT | Performed by: INTERNAL MEDICINE

## 2025-04-14 PROCEDURE — 1126F AMNT PAIN NOTED NONE PRSNT: CPT | Performed by: INTERNAL MEDICINE

## 2025-04-14 RX ORDER — SODIUM CHLORIDE 9 MG/ML
20 INJECTION, SOLUTION INTRAVENOUS ONCE
Status: COMPLETED | OUTPATIENT
Start: 2025-04-14 | End: 2025-04-14

## 2025-04-14 RX ORDER — SODIUM CHLORIDE 9 MG/ML
20 INJECTION, SOLUTION INTRAVENOUS ONCE
Status: CANCELLED | OUTPATIENT
Start: 2025-04-14

## 2025-04-14 RX ORDER — LISINOPRIL 20 MG/1
1 TABLET ORAL DAILY
COMMUNITY
Start: 2025-03-15

## 2025-04-14 RX ADMIN — SODIUM CHLORIDE 20 ML/HR: 9 INJECTION, SOLUTION INTRAVENOUS at 10:35

## 2025-04-14 RX ADMIN — SODIUM CHLORIDE 200 MG: 9 INJECTION, SOLUTION INTRAVENOUS at 10:40

## 2025-04-14 NOTE — PROGRESS NOTES
Chief Complaint/Reason for Referral:  FOLLOW UP 1     Provider, No Known  Cecily Nicholas APRN    Records Obtained:  Records of the patients history including those obtained from [employer] EMR were reviewed and summarized in detail.    Subjective    History of Present Illness    Katherine Rodriguez 44 y.o. presents to [facility] HEMATOLOGY [facility] ONCOLOGY for RCC    Very pleasant 44-year-old female presents to the hematology department for further evaluation of her normocytic anemia and left RCC.  Hx of CKD IIIb. No abd pain, she does have heavy menses cycle for many years.  Has had PAP at health dept.     Patient had radical left nephrectomy on 6 December 2024.  This showed clear-cell renal cell carcinoma with 0 of 17 lymph nodes involved.  Renal vein margin positive for carcinoma.  Adrenal gland with no malignancy.  Pathologic pT3b N0.  Grade 4.  Stage III.    Patient has recovered well from surgery.  Denies any bleeding.  Still has stent in place.  Has seen Dr. Deal with urology for follow-up.  Has follow-up planned on the 18th as well.  No acute concerns today.  Agreeable to starting immunotherapy for her RCC.     Started adjuvant pembrolizumab 2/24/2025.     Interval history  Patient presents for follow-up.  She is due for third cycle of pembrolizumab.  Patient hospitalized since second cycle due to GURINDER on CKD.  Found to have right-sided hydronephrosis.  Had ureteral stent placed.  Kidney numbers have improved.  Creatinine down to 2.3 today.  Due to see Dr. Deal's office for follow-up for the stent.  Has mild rash on the back.  Reports it is not really itchy.  Reports she has been losing her hair.  This has been past few months.  Also reports continuous menstrual cycle.  Present over a month for most of the days.  Has not seen gynecology.  Denies any nausea or vomiting.  Denies any blood in her urine.  No diarrhea reported.  No new cough.  No new pain. Ready for treatment.     Hematology  History  Patient's most recent CBC reveals a white blood cell count of 8.9, hemoglobin of 8.7 hematocrit 26.9, MCV of 88, platelet count of 410.  In March 2024 patient's hemoglobin was 12.6 on October 1 patient's hemoglobin was 9.3.    October 7, 2024: FOBT negative    10/29/24: WBC 8.29, hgb 8.2, plt 391. Ferritin 606,  iron sat 7%, TIBC 361. LDH and hapto elevated. B12 normal at 598, folate low at 3.4 (supplementation started)  Hemochromatosis testing: c.845G>A (p.Nmy409Pds) - Detected, heterozygous   c.187C>G (p.Epc79Vmo) - Detected, heterozygous   c.193A>T (p.Cph20Pfb) - Not Detected   Holding phlebotomy due to low iron sat.    3/21/25: Ferritin 266, iron sta 27%         Cancer-related family history includes Breast cancer in her paternal aunt. There is no history of Ovarian cancer, Uterine cancer, or Colon cancer.     Oncology/Hematology History   Renal cell carcinoma of left kidney   2/4/2025 Initial Diagnosis    Renal cell carcinoma of left kidney     2/4/2025 Cancer Staged    Staging form: Kidney, AJCC 8th Edition  - Pathologic: Stage III (pT3b, pN0, cM0) - Signed by Guy Layne MD on 2/4/2025 2/24/2025 -  Chemotherapy    OP Pembrolizumab 200 mg     3/17/2025 -  Chemotherapy    OP SUPPORTIVE HYDRATION + ANTIEMETICS       Review of Systems   Constitutional:  Negative for appetite change, diaphoresis, fatigue, fever, unexpected weight gain and unexpected weight loss.   HENT: Negative.  Negative for hearing loss, mouth sores, sore throat, swollen glands, trouble swallowing and voice change.    Eyes: Negative.  Negative for blurred vision.   Respiratory: Negative.  Negative for cough, shortness of breath and wheezing.    Cardiovascular: Negative.  Negative for chest pain and palpitations.   Gastrointestinal: Negative.  Negative for abdominal pain, blood in stool, constipation, diarrhea, nausea and vomiting.   Endocrine: Negative.  Negative for cold intolerance and heat intolerance.    Genitourinary: Negative.  Negative for difficulty urinating, dysuria, frequency, hematuria and urinary incontinence.   Musculoskeletal: Negative.  Negative for arthralgias, back pain and myalgias.   Skin: Negative.  Negative for rash, skin lesions and wound.   Allergic/Immunologic: Negative.    Neurological: Negative.  Negative for dizziness, seizures, weakness, numbness and headache.   Hematological: Negative.  Does not bruise/bleed easily.   Psychiatric/Behavioral:  Negative for depressed mood. The patient is nervous/anxious.    All other systems reviewed and are negative.    Current Outpatient Medications on File Prior to Visit   Medication Sig Dispense Refill    amLODIPine (NORVASC) 10 MG tablet Take 1 tablet by mouth Daily.      buPROPion SR (WELLBUTRIN SR) 150 MG 12 hr tablet Take 1 tablet by mouth Daily.      Cholecalciferol (Vitamin D) 50 MCG (2000 UT) capsule Take 1 capsule by mouth Daily.      Eliquis 5 MG tablet tablet Take 1 tablet by mouth 2 (Two) Times a Day.      ferrous gluconate (FERGON) 240 (27 FE) MG tablet Take 1 tablet by mouth Every Other Day.      folic acid (FOLVITE) 1 MG tablet Take 1 tablet by mouth Daily. 90 tablet 1     No current facility-administered medications on file prior to visit.     No Known Allergies  Past Medical History:   Diagnosis Date    Anxiety     Cancer 2024    Kidney cancer. Took out my left kidney.    History of transfusion 2024    Had to get blood for surgery and after for my hemoglobin being low.    Hypertension     Multiple gestation 1999    Normocytic anemia     Trichomonas vaginalis infection 2024    Varicella     Whwn i was a younger child.    Vitamin D deficiency      Past Surgical History:   Procedure Laterality Date    BLADDER REPAIR W/  SECTION       SECTION  2013    CHOLECYSTECTOMY      CYSTOSCOPY, RETROGRADE PYELOGRAM AND STENT INSERTION Right 3/20/2025    Procedure: CYSTOSCOPY RETROGRADE  "PYELOGRAM AND STENT INSERTION, right  Plain text: Scope bladder, shoot x-ray, place stent, right;  Surgeon: Veronica Lawrence MD;  Location: [facility] OR;  Service: Urology;  Laterality: Right;    LAPAROSCOPIC CHOLECYSTECTOMY  2021    NEPHRECTOMY RADICAL Left 12/06/2024    cancer    TONSILLECTOMY      TUBAL ABDOMINAL LIGATION  01-     Social History     Socioeconomic History    Marital status:    Tobacco Use    Smoking status: Every Day     Current packs/day: 1.50     Average packs/day: 1.5 packs/day for 32.3 years (48.4 ttl pk-yrs)     Types: Cigarettes     Start date: 1/1/1993   Vaping Use    Vaping status: Never Used   Substance and Sexual Activity    Alcohol use: Never    Drug use: Never    Sexual activity: Not Currently     Partners: Male     Birth control/protection: Condom, Tubal ligation     Family History   Problem Relation Age of Onset    Hypertension Father     Diabetes Mother     Diabetes Maternal Grandmother     Breast cancer Paternal Aunt     Ovarian cancer Neg Hx     Uterine cancer Neg Hx     Colon cancer Neg Hx     Deep vein thrombosis Neg Hx     Pulmonary embolism Neg Hx      Immunization History   Administered Date(s) Administered    Fluzone (or Fluarix & Flulaval for VFC) >6mos 09/23/2020    Tdap 10/20/2020     Tobacco Use: High Risk (4/14/2025)    Patient History     Smoking Tobacco Use: Every Day     Smokeless Tobacco Use: Unknown     Passive Exposure: Not on file     Objective     Vitals:    04/14/25 0921   BP: 126/82   Pulse: 103   Resp: 18   Temp: 97.6 °F (36.4 °C)   TempSrc: Temporal   SpO2: 100%   Weight: 73.7 kg (162 lb 7.7 oz)   Height: 160 cm (62.99\")   PainSc: 0-No pain          Physical Exam  Vitals and nursing note reviewed.   Constitutional:       General: She is not in acute distress.     Appearance: Normal appearance. She is not ill-appearing.   HENT:      Head: Normocephalic.   Eyes:      Conjunctiva/sclera: Conjunctivae normal.   Cardiovascular:      Rate and " Rhythm: Normal rate and regular rhythm.      Heart sounds: Normal heart sounds.   Pulmonary:      Effort: Pulmonary effort is normal.      Breath sounds: Normal breath sounds.   Abdominal:      Palpations: There is no hepatomegaly or splenomegaly.   Lymphadenopathy:      Cervical: No cervical adenopathy.      Right cervical: No superficial cervical adenopathy.     Left cervical: No superficial cervical adenopathy.      Upper Body:      Right upper body: No axillary adenopathy.      Left upper body: No axillary adenopathy.   Skin:     Coloration: Skin is not jaundiced.   Neurological:      Mental Status: She is alert.   Psychiatric:         Mood and Affect: Mood normal.         Behavior: Behavior normal. Behavior is cooperative.         Thought Content: Thought content normal.         Judgment: Judgment normal.     Wt Readings from Last 3 Encounters:   03/19/25 77.7 kg (171 lb 4.8 oz)   03/19/25 77.6 kg (171 lb 1.2 oz)   03/17/25 75.3 kg (166 lb 0.1 oz)      Body mass index is 28.79 kg/m².              ECOG: (0) Fully Active - Able to Carry On All Pre-disease Performance Without Restriction  Fall Risk Assessment was completed, and patient is at low risk for falls.  PHQ-9 Total Score:         The patient is  experiencing fatigue. Fatigue score: 7    PT/OT Functional Screening:   Speech Functional Screening:   Rehab to be ordered:         Result Review :  The following data was reviewed by: Guy Layne MD on 04/14/25:    LABS SCANNED (10/17/2024)  LABS SCANNED (10/07/2024)  LABS SCANNED (09/30/2024)  LABS SCANNED (03/22/2024)    Labs personally reviewed.  Notable for creatinine close to baseline at 2.3.  Anemia better but still low.  K normal.  CO2 normal.  LFTs normal. WBC normal. Plts normal.            Assessment and Plan:  Diagnoses and all orders for this visit:    1. Renal cell carcinoma of left kidney (Primary)  -     T4, Free; Future  -     TSH; Future    2. Encounter for long-term current use of  high risk medication  -     T4, Free; Future  -     TSH; Future    3. Stage 4 chronic kidney disease    4. Anemia in stage 4 chronic kidney disease    5. Prolonged menstrual cycle    Other orders  -     Cancel: sodium chloride 0.9 % infusion  -     Cancel: Pembrolizumab (KEYTRUDA) 200 mg in sodium chloride 0.9 % 58 mL chemo IVPB  -     OK To Treat              Stage III left RCC  Left radical nephrectomy 12/6/24: Positive margin renal vein.  R1 dissection.  Grade 4.  0 of 17 lymph nodes involved.  Pathologic pT3b N0.  Will need to get baseline CT chest.  Since patient has recovered from surgery.  Category 1 recommendation for adjuvant pembrolizumab every 3 weeks for 1 year discussed with patient. She started pembrolizumab 2/24/25.     4/14/25: C3D1 adjuvant pembrolizumab 200 mg every 3 weeks. Labs appropriate to proceed.    RTC C4    Repeat scans after C5 with CT CAP with contrast (due end of May 2025). Last CT abdomen/pelvis with some reactive nodes.     Continue to monitor for severe toxicities with frequent labs and office visits.    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 Referred to nephrology. Recent GURINDER due to obstruction. Ureteral stent placed in hospital with improvement in creatinine. 4/14/25: Creatinine close to baseline    Hair loss  Likely hormonal related as she is also having menstrual cycle for most days of past month. Rule out thyroid changes with TSH/fee T4 today    Prolonged menstrual bleeding  Defer to gynecology.     Anemia  Could potentially be related to RCC that has now been resected.  Will need to follow this closely.  Holding iron supplementation due to elevated ferritin and combined heterozygous hemochromatosis mutations positive. Last iron studdies 3/21/25 with ferritin of 266 and iron sat of 27%. Repeat CBC today 4/14/25 with stability. Likely due in part to her CKD. May need to consider EPO.      Hemochromatosis  Patient with elevated ferritin to 600.  This certainly could be related to her chronic disease as well as renal cell carcinoma.  Hemochromatosis testing did detect heterozygous mutations in 2 different mutations.  This typically does not lead to clinically relevant hemochromatosis.  Further patient has low iron saturation which is not consistent with iron overload as this would be elevated. Will need to follow ferritin levels closely.  No phlebotomy for now. Will also avoid iron supplementation for this time.  Patient does not have elevated LFTs.  We may need to consider MRI of the liver if ferritin increases further.     Folate def  Mild. Agree with daily folate supplementation. B12 normal.       Patient Follow Up: C4 keytruda    I spent 30 minutes caring for Katherine on this date of service. This time includes time spent by me in the following activities:preparing for the visit, reviewing tests, obtaining and/or reviewing a separately obtained history, performing a medically appropriate examination and/or evaluation , counseling and educating the patient/family/caregiver, ordering medications, tests, or procedures, documenting information in the medical record, and independently interpreting results and communicating that information with the patient/family/caregiver    Patient was given instructions and counseling regarding her condition or for health maintenance advice. Please see specific information pulled into the AVS if appropriate.

## 2025-05-05 ENCOUNTER — OFFICE VISIT (OUTPATIENT)
Dept: ONCOLOGY | Facility: HOSPITAL | Age: 45
End: 2025-05-05
Payer: COMMERCIAL

## 2025-05-05 ENCOUNTER — HOSPITAL ENCOUNTER (OUTPATIENT)
Dept: ONCOLOGY | Facility: HOSPITAL | Age: 45
Discharge: HOME | End: 2025-05-05
Payer: COMMERCIAL

## 2025-05-05 ENCOUNTER — APPOINTMENT (OUTPATIENT)
Dept: ONCOLOGY | Facility: HOSPITAL | Age: 45
End: 2025-05-05
Payer: COMMERCIAL

## 2025-05-05 VITALS
HEIGHT: 63 IN | SYSTOLIC BLOOD PRESSURE: 144 MMHG | TEMPERATURE: 97.1 F | BODY MASS INDEX: 29.77 KG/M2 | HEART RATE: 96 BPM | WEIGHT: 167.99 LBS | RESPIRATION RATE: 18 BRPM | DIASTOLIC BLOOD PRESSURE: 86 MMHG | OXYGEN SATURATION: 100 %

## 2025-05-05 DIAGNOSIS — C64.2 RENAL CELL CARCINOMA OF LEFT KIDNEY: ICD-10-CM

## 2025-05-05 DIAGNOSIS — C64.2 RENAL CELL CARCINOMA OF LEFT KIDNEY: Primary | ICD-10-CM

## 2025-05-05 DIAGNOSIS — F32.89 OTHER DEPRESSION: ICD-10-CM

## 2025-05-05 DIAGNOSIS — E53.8 FOLATE DEFICIENCY: ICD-10-CM

## 2025-05-05 DIAGNOSIS — N18.4 STAGE 4 CHRONIC KIDNEY DISEASE: ICD-10-CM

## 2025-05-05 DIAGNOSIS — D64.9 ANEMIA, UNSPECIFIED TYPE: ICD-10-CM

## 2025-05-05 LAB
ALBUMIN SERPL-MCNC: 4.4 G/DL (ref 3.5–5.2)
ALBUMIN/GLOB SERPL: 1 G/DL
ALP SERPL-CCNC: 146 U/L (ref 39–117)
ALT SERPL W P-5'-P-CCNC: 7 U/L (ref 1–33)
ANION GAP SERPL CALCULATED.3IONS-SCNC: 16.2 MMOL/L (ref 5–15)
AST SERPL-CCNC: 12 U/L (ref 1–32)
BASOPHILS # BLD AUTO: 0.04 10*3/MM3 (ref 0–0.2)
BASOPHILS NFR BLD AUTO: 0.5 % (ref 0–1.5)
BILIRUB SERPL-MCNC: 0.2 MG/DL (ref 0–1.2)
BUN SERPL-MCNC: 24 MG/DL (ref 6–20)
BUN/CREAT SERPL: 11.2 (ref 7–25)
CALCIUM SPEC-SCNC: 9.7 MG/DL (ref 8.6–10.5)
CHLORIDE SERPL-SCNC: 97 MMOL/L (ref 98–107)
CO2 SERPL-SCNC: 22.8 MMOL/L (ref 22–29)
CREAT SERPL-MCNC: 2.14 MG/DL (ref 0.57–1)
DEPRECATED RDW RBC AUTO: 54.3 FL (ref 37–54)
EGFRCR SERPLBLD CKD-EPI 2021: 28.7 ML/MIN/1.73
EOSINOPHIL # BLD AUTO: 0.23 10*3/MM3 (ref 0–0.4)
EOSINOPHIL NFR BLD AUTO: 2.8 % (ref 0.3–6.2)
ERYTHROCYTE [DISTWIDTH] IN BLOOD BY AUTOMATED COUNT: 15.1 % (ref 12.3–15.4)
GLOBULIN UR ELPH-MCNC: 4.4 GM/DL
GLUCOSE SERPL-MCNC: 95 MG/DL (ref 65–99)
HCT VFR BLD AUTO: 27.3 % (ref 34–46.6)
HGB BLD-MCNC: 8.9 G/DL (ref 12–15.9)
IMM GRANULOCYTES # BLD AUTO: 0.03 10*3/MM3 (ref 0–0.05)
IMM GRANULOCYTES NFR BLD AUTO: 0.4 % (ref 0–0.5)
LYMPHOCYTES # BLD AUTO: 1.43 10*3/MM3 (ref 0.7–3.1)
LYMPHOCYTES NFR BLD AUTO: 17.7 % (ref 19.6–45.3)
MCH RBC QN AUTO: 31.7 PG (ref 26.6–33)
MCHC RBC AUTO-ENTMCNC: 32.6 G/DL (ref 31.5–35.7)
MCV RBC AUTO: 97.2 FL (ref 79–97)
MONOCYTES # BLD AUTO: 0.42 10*3/MM3 (ref 0.1–0.9)
MONOCYTES NFR BLD AUTO: 5.2 % (ref 5–12)
NEUTROPHILS NFR BLD AUTO: 5.93 10*3/MM3 (ref 1.7–7)
NEUTROPHILS NFR BLD AUTO: 73.4 % (ref 42.7–76)
NRBC BLD AUTO-RTO: 0 /100 WBC (ref 0–0.2)
PLATELET # BLD AUTO: 316 10*3/MM3 (ref 140–450)
PMV BLD AUTO: 9.4 FL (ref 6–12)
POTASSIUM SERPL-SCNC: 4.2 MMOL/L (ref 3.5–5.2)
PROT SERPL-MCNC: 8.8 G/DL (ref 6–8.5)
RBC # BLD AUTO: 2.81 10*6/MM3 (ref 3.77–5.28)
SODIUM SERPL-SCNC: 136 MMOL/L (ref 136–145)
T4 FREE SERPL-MCNC: 1.49 NG/DL (ref 0.92–1.68)
TSH SERPL DL<=0.05 MIU/L-ACNC: 2.54 UIU/ML (ref 0.27–4.2)
WBC NRBC COR # BLD AUTO: 8.08 10*3/MM3 (ref 3.4–10.8)

## 2025-05-05 PROCEDURE — 84439 ASSAY OF FREE THYROXINE: CPT | Performed by: INTERNAL MEDICINE

## 2025-05-05 PROCEDURE — 1126F AMNT PAIN NOTED NONE PRSNT: CPT | Performed by: INTERNAL MEDICINE

## 2025-05-05 PROCEDURE — 80053 COMPREHEN METABOLIC PANEL: CPT | Performed by: INTERNAL MEDICINE

## 2025-05-05 PROCEDURE — 84443 ASSAY THYROID STIM HORMONE: CPT | Performed by: INTERNAL MEDICINE

## 2025-05-05 PROCEDURE — 25010000002 PEMBROLIZUMAB 100 MG/4ML SOLUTION 4 ML VIAL: Performed by: INTERNAL MEDICINE

## 2025-05-05 PROCEDURE — 25810000003 SODIUM CHLORIDE 0.9 % SOLUTION: Performed by: INTERNAL MEDICINE

## 2025-05-05 PROCEDURE — 96413 CHEMO IV INFUSION 1 HR: CPT

## 2025-05-05 PROCEDURE — 85025 COMPLETE CBC W/AUTO DIFF WBC: CPT | Performed by: INTERNAL MEDICINE

## 2025-05-05 PROCEDURE — 99214 OFFICE O/P EST MOD 30 MIN: CPT | Performed by: INTERNAL MEDICINE

## 2025-05-05 RX ORDER — SODIUM CHLORIDE 9 MG/ML
20 INJECTION, SOLUTION INTRAVENOUS ONCE
Status: CANCELLED | OUTPATIENT
Start: 2025-05-05

## 2025-05-05 RX ORDER — BUPROPION HYDROCHLORIDE 150 MG/1
150 TABLET, EXTENDED RELEASE ORAL DAILY
Qty: 30 TABLET | Refills: 3 | Status: SHIPPED | OUTPATIENT
Start: 2025-05-05

## 2025-05-05 RX ORDER — SODIUM CHLORIDE 9 MG/ML
20 INJECTION, SOLUTION INTRAVENOUS ONCE
Status: COMPLETED | OUTPATIENT
Start: 2025-05-05 | End: 2025-05-05

## 2025-05-05 RX ORDER — HYDROCHLOROTHIAZIDE 25 MG/1
TABLET ORAL
COMMUNITY
Start: 2025-04-21

## 2025-05-05 RX ADMIN — SODIUM CHLORIDE 20 ML/HR: 9 INJECTION, SOLUTION INTRAVENOUS at 11:29

## 2025-05-05 RX ADMIN — SODIUM CHLORIDE 200 MG: 9 INJECTION, SOLUTION INTRAVENOUS at 11:30

## 2025-05-05 NOTE — PROGRESS NOTES
Chief Complaint/Reason for Referral:   FOLLOW UP 1     Provider, No Known  Cecily Nicholas APRN    Records Obtained:  Records of the patients history including those obtained from [employer] EMR were reviewed and summarized in detail.    Subjective    History of Present Illness    Katherine Rodriguez 44 y.o. presents to [facility] for RCC    Very pleasant 44-year-old female presents to the hematology department for further evaluation of her normocytic anemia and left RCC.  Hx of CKD IIIb. No abd pain, she does have heavy menses cycle for many years.  Has had PAP at health dept.     Patient had radical left nephrectomy on 6 December 2024.  This showed clear-cell renal cell carcinoma with 0 of 17 lymph nodes involved.  Renal vein margin positive for carcinoma.  Adrenal gland with no malignancy.  Pathologic pT3b N0.  Grade 4.  Stage III.    Patient has recovered well from surgery.  Denies any bleeding.  Still has stent in place.  Has seen Dr. Deal with urology for follow-up.  Has follow-up planned on the 18th as well.  No acute concerns today.  Agreeable to starting immunotherapy for her RCC.     Started adjuvant pembrolizumab 2/24/2025.     Interval history  Patient presents for follow-up.  She is due for fourth cycle of pembrolizumab.   Still has right ureteral stent placed.  Kidney numbers have improved.  Creatinine down to 2.14 today.  Due to see Dr. Deal's office for follow-up for the stent early June.  Has mild rash on the back - comes and goes. Has not needed treatment.   Also reports menstrual cycle that has been off and on for over a month.  Has not seen gynecology yet, is waiting to schedule this.  Denies any nausea or vomiting.  Denies any blood in her urine.  No diarrhea reported.  No new cough.  No new pain. Feels well. Needs refill of wellbutrin. Ready for treatment.     Hematology History  Patient's most recent CBC reveals a white blood cell count of 8.9,  hemoglobin of 8.7 hematocrit 26.9, MCV of 88, platelet count of 410.  In March 2024 patient's hemoglobin was 12.6 on October 1 patient's hemoglobin was 9.3.    October 7, 2024: FOBT negative    10/29/24: WBC 8.29, hgb 8.2, plt 391. Ferritin 606,  iron sat 7%, TIBC 361. LDH and hapto elevated. B12 normal at 598, folate low at 3.4 (supplementation started)  Hemochromatosis testing: c.845G>A (p.Flb880Jvz) - Detected, heterozygous   c.187C>G (p.Ngo50Vbp) - Detected, heterozygous   c.193A>T (p.Neb27Doi) - Not Detected   Holding phlebotomy due to low iron sat.    3/21/25: Ferritin 266, iron sat 27%         Cancer-related family history includes Breast cancer in her paternal aunt. There is no history of Ovarian cancer, Uterine cancer, or Colon cancer.     Oncology/Hematology History   Renal cell carcinoma of left kidney   2/4/2025 Initial Diagnosis    Renal cell carcinoma of left kidney     2/4/2025 Cancer Staged    Staging form: Kidney, AJCC 8th Edition  - Pathologic: Stage III (pT3b, pN0, cM0) - Signed by Guy Layne MD on 2/4/2025 2/24/2025 -  Chemotherapy    OP Pembrolizumab 200 mg     3/17/2025 -  Chemotherapy    OP SUPPORTIVE HYDRATION + ANTIEMETICS       Review of Systems   Constitutional:  Negative for appetite change, diaphoresis, fatigue, fever, unexpected weight gain and unexpected weight loss.   HENT: Negative.  Negative for hearing loss, mouth sores, sore throat, swollen glands, trouble swallowing and voice change.    Eyes: Negative.  Negative for blurred vision.   Respiratory: Negative.  Negative for cough, shortness of breath and wheezing.    Cardiovascular: Negative.  Negative for chest pain and palpitations.   Gastrointestinal: Negative.  Negative for abdominal pain, blood in stool, constipation, diarrhea, nausea and vomiting.   Endocrine: Negative.  Negative for cold intolerance and heat intolerance.   Genitourinary: Negative.  Negative for difficulty urinating, dysuria, frequency,  hematuria and urinary incontinence.   Musculoskeletal: Negative.  Negative for arthralgias, back pain and myalgias.   Skin: Negative.  Negative for rash, skin lesions and wound.   Allergic/Immunologic: Negative.    Neurological: Negative.  Negative for dizziness, seizures, weakness, numbness and headache.   Hematological: Negative.  Does not bruise/bleed easily.   Psychiatric/Behavioral:  Negative for depressed mood. The patient is nervous/anxious.    All other systems reviewed and are negative.    Current Outpatient Medications on File Prior to Visit   Medication Sig Dispense Refill    hydroCHLOROthiazide 25 MG tablet       amLODIPine (NORVASC) 10 MG tablet Take 1 tablet by mouth Daily.      buPROPion SR (WELLBUTRIN SR) 150 MG 12 hr tablet Take 1 tablet by mouth Daily.      Cholecalciferol (Vitamin D) 50 MCG (2000 UT) capsule Take 1 capsule by mouth Daily.      Eliquis 5 MG tablet tablet Take 1 tablet by mouth 2 (Two) Times a Day.      ferrous gluconate (FERGON) 240 (27 FE) MG tablet Take 1 tablet by mouth Every Other Day.      folic acid (FOLVITE) 1 MG tablet Take 1 tablet by mouth Daily. 90 tablet 1    lisinopril (PRINIVIL,ZESTRIL) 20 MG tablet Take 1 tablet by mouth Daily. (Patient not taking: Reported on 2025)       No current facility-administered medications on file prior to visit.     No Known Allergies  Past Medical History:   Diagnosis Date    Anxiety     Cancer 2024    Kidney cancer. Took out my left kidney.    History of transfusion 2024    Had to get blood for surgery and after for my hemoglobin being low.    Hypertension     Multiple gestation 1999    Normocytic anemia     Trichomonas vaginalis infection 2024    Varicella     Whwn i was a younger child.    Vitamin D deficiency      Past Surgical History:   Procedure Laterality Date    BLADDER REPAIR W/  SECTION       SECTION  2013    CHOLECYSTECTOMY      CYSTOSCOPY, RETROGRADE PYELOGRAM  "AND STENT INSERTION Right 3/20/2025    Procedure: CYSTOSCOPY RETROGRADE PYELOGRAM AND STENT INSERTION, right  Plain text: Scope bladder, shoot x-ray, place stent, right;  Surgeon: Veronica Lawrence MD;  Location: [facility] OR;  Service: Urology;  Laterality: Right;    LAPAROSCOPIC CHOLECYSTECTOMY  2021    NEPHRECTOMY RADICAL Left 12/06/2024    cancer    TONSILLECTOMY      TUBAL ABDOMINAL LIGATION  01-     Social History     Socioeconomic History    Marital status:    Tobacco Use    Smoking status: Every Day     Current packs/day: 1.50     Average packs/day: 1.5 packs/day for 32.3 years (48.5 ttl pk-yrs)     Types: Cigarettes     Start date: 1/1/1993   Vaping Use    Vaping status: Never Used   Substance and Sexual Activity    Alcohol use: Never    Drug use: Never    Sexual activity: Not Currently     Partners: Male     Birth control/protection: Condom, Tubal ligation     Family History   Problem Relation Age of Onset    Hypertension Father     Diabetes Mother     Diabetes Maternal Grandmother     Breast cancer Paternal Aunt     Ovarian cancer Neg Hx     Uterine cancer Neg Hx     Colon cancer Neg Hx     Deep vein thrombosis Neg Hx     Pulmonary embolism Neg Hx      Immunization History   Administered Date(s) Administered    Fluzone (or Fluarix & Flulaval for VFC) >6mos 09/23/2020    Tdap 10/20/2020     Tobacco Use: High Risk (5/5/2025)    Patient History     Smoking Tobacco Use: Every Day     Smokeless Tobacco Use: Unknown     Passive Exposure: Not on file     Objective     Vitals:    05/05/25 1043   BP: 144/86   Pulse: 96   Resp: 18   Temp: 97.1 °F (36.2 °C)   TempSrc: Temporal   SpO2: 100%   Weight: 76.2 kg (167 lb 15.9 oz)   Height: 160 cm (62.99\")   PainSc: 0-No pain            Physical Exam  Vitals and nursing note reviewed.   Constitutional:       General: She is not in acute distress.     Appearance: Normal appearance. She is not ill-appearing.   HENT:      Head: Normocephalic.   Eyes:      " Conjunctiva/sclera: Conjunctivae normal.   Cardiovascular:      Rate and Rhythm: Normal rate and regular rhythm.      Heart sounds: Normal heart sounds.   Pulmonary:      Effort: Pulmonary effort is normal.      Breath sounds: Normal breath sounds.   Abdominal:      Palpations: There is no hepatomegaly or splenomegaly.   Lymphadenopathy:      Cervical: No cervical adenopathy.      Right cervical: No superficial cervical adenopathy.     Left cervical: No superficial cervical adenopathy.      Upper Body:      Right upper body: No axillary adenopathy.      Left upper body: No axillary adenopathy.   Skin:     Coloration: Skin is not jaundiced.   Neurological:      Mental Status: She is alert.   Psychiatric:         Mood and Affect: Mood normal.         Behavior: Behavior normal. Behavior is cooperative.         Thought Content: Thought content normal.         Judgment: Judgment normal.       Wt Readings from Last 3 Encounters:   04/14/25 73.7 kg (162 lb 7.7 oz)   04/14/25 73.7 kg (162 lb 7.7 oz)   03/19/25 77.7 kg (171 lb 4.8 oz)      Body mass index is 29.77 kg/m².              ECOG: (0) Fully Active - Able to Carry On All Pre-disease Performance Without Restriction  Fall Risk Assessment was completed, and patient is at low risk for falls.  PHQ-9 Total Score:         The patient is  experiencing fatigue. Fatigue score: 7    PT/OT Functional Screening:   Speech Functional Screening:   Rehab to be ordered:         Result Review :  The following data was reviewed by: Guy Layne MD on 05/05/25:    LABS SCANNED (10/17/2024)  LABS SCANNED (10/07/2024)  LABS SCANNED (09/30/2024)  LABS SCANNED (03/22/2024)    Labs personally reviewed.  Notable for creatinine close to baseline at 2.1.  Anemia better but still low.  K normal.  CO2 normal.  LFTs normal. WBC normal. Plts normal.            Assessment and Plan:  Diagnoses and all orders for this visit:    1. Renal cell carcinoma of left kidney (Primary)  -     CT chest  wo contrast; Future  -     CT abdomen pelvis wo contrast; Future    2. Anemia, unspecified type    3. Folate deficiency    4. Other depression    5. Stage 4 chronic kidney disease    Other orders  -     buPROPion SR (WELLBUTRIN SR) 150 MG 12 hr tablet; Take 1 tablet by mouth Daily.  Dispense: 30 tablet; Refill: 3  -     Cancel: sodium chloride 0.9 % infusion  -     Cancel: Pembrolizumab (KEYTRUDA) 200 mg in sodium chloride 0.9 % 58 mL chemo IVPB  -     OK To Treat            Stage III left RCC  Left radical nephrectomy 12/6/24: Positive margin renal vein.  R1 dissection.  Grade 4.  0 of 17 lymph nodes involved.  Pathologic pT3b N0.  Will need to get baseline CT chest.  Since patient has recovered from surgery.  Category 1 recommendation for adjuvant pembrolizumab every 3 weeks for 1 year discussed with patient. She started pembrolizumab 2/24/25.     5/5/25: C4D1 adjuvant pembrolizumab 200 mg every 3 weeks. Labs appropriate to proceed - ok to treat for her CrCl.     RTC C5    Repeat scans with CT CAP without IV contrast but with oral contrast. Last CT abdomen/pelvis with some reactive nodes.     Continue to monitor for severe toxicities with frequent labs and office visits.    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 Referred to nephrology. Recent GURINDER due to obstruction. Ureteral stent placed in hospital with improvement in creatinine. 4/14/25: Creatinine close to baseline. 5/5/25: Creatinine near baseline. [facility].     Hair loss  Likely hormonal related as she is also having menstrual cycle for most days of past month. Thyroid studies normal.    Prolonged menstrual bleeding  Defer to gynecology.     Anemia  Likely related to RCC and CKD and recent procedures. Will need to follow this closely.  Holding iron supplementation due to elevated ferritin and combined heterozygous hemochromatosis mutations  positive. Last iron studdies 3/21/25 with ferritin of 266 and iron sat of 27%. Repeat CBC 5/5/25 with improvement to 8.9 g/dL. Likely due in part to her CKD. Can consider EPO but will hold for now.     Hemochromatosis  Patient with elevated ferritin to 600.  This certainly could be related to her chronic disease as well as renal cell carcinoma.  Hemochromatosis testing did detect heterozygous mutations in 2 different mutations.  This typically does not lead to clinically relevant hemochromatosis.  Further patient has low iron saturation which is not consistent with iron overload as this would be elevated. Will need to follow ferritin levels closely. Ferritin already down to 266 as of 3/21/25.  No phlebotomy for now. Will also avoid iron supplementation. Patient does not have elevated LFTs.  We may need to consider MRI of the liver if ferritin increases further.     Folate def  Mild. Agree with daily folate supplementation. B12 normal.       Patient Follow Up: C5 keytruda    I spent 30 minutes caring for Katherine on this date of service. This time includes time spent by me in the following activities:preparing for the visit, reviewing tests, obtaining and/or reviewing a separately obtained history, performing a medically appropriate examination and/or evaluation , counseling and educating the patient/family/caregiver, ordering medications, tests, or procedures, documenting information in the medical record, and independently interpreting results and communicating that information with the patient/family/caregiver    Patient was given instructions and counseling regarding her condition or for health maintenance advice. Please see specific information pulled into the AVS if appropriate.

## 2025-05-17 DIAGNOSIS — D52.9 ANEMIA DUE TO FOLIC ACID DEFICIENCY, UNSPECIFIED DEFICIENCY TYPE: ICD-10-CM

## 2025-05-19 RX ORDER — FOLIC ACID 1 MG/1
1000 TABLET ORAL DAILY
Qty: 30 TABLET | Refills: 5 | Status: SHIPPED | OUTPATIENT
Start: 2025-05-19

## 2025-05-21 ENCOUNTER — HOSPITAL ENCOUNTER (OUTPATIENT)
Dept: CT IMAGING | Facility: HOSPITAL | Age: 45
Discharge: HOME | End: 2025-05-21
Admitting: INTERNAL MEDICINE
Payer: COMMERCIAL

## 2025-05-21 DIAGNOSIS — C64.2 RENAL CELL CARCINOMA OF LEFT KIDNEY: ICD-10-CM

## 2025-05-21 PROCEDURE — 74176 CT ABD & PELVIS W/O CONTRAST: CPT

## 2025-05-21 PROCEDURE — 71250 CT THORAX DX C-: CPT

## 2025-05-27 ENCOUNTER — HOSPITAL ENCOUNTER (OUTPATIENT)
Dept: ONCOLOGY | Facility: HOSPITAL | Age: 45
Discharge: HOME | End: 2025-05-27
Payer: COMMERCIAL

## 2025-05-27 ENCOUNTER — OFFICE VISIT (OUTPATIENT)
Dept: ONCOLOGY | Facility: HOSPITAL | Age: 45
End: 2025-05-27
Payer: COMMERCIAL

## 2025-05-27 VITALS
OXYGEN SATURATION: 100 % | DIASTOLIC BLOOD PRESSURE: 79 MMHG | TEMPERATURE: 97.6 F | WEIGHT: 170.19 LBS | RESPIRATION RATE: 18 BRPM | HEART RATE: 94 BPM | HEIGHT: 63 IN | BODY MASS INDEX: 30.16 KG/M2 | SYSTOLIC BLOOD PRESSURE: 131 MMHG

## 2025-05-27 DIAGNOSIS — D64.9 ANEMIA, UNSPECIFIED TYPE: ICD-10-CM

## 2025-05-27 DIAGNOSIS — E83.119 HEMOCHROMATOSIS, UNSPECIFIED HEMOCHROMATOSIS TYPE: ICD-10-CM

## 2025-05-27 DIAGNOSIS — C64.2 RENAL CELL CARCINOMA OF LEFT KIDNEY: Primary | ICD-10-CM

## 2025-05-27 DIAGNOSIS — N18.32 CKD STAGE 3B, GFR 30-44 ML/MIN: ICD-10-CM

## 2025-05-27 LAB
ALBUMIN SERPL-MCNC: 4.3 G/DL (ref 3.5–5.2)
ALBUMIN/GLOB SERPL: 1.1 G/DL
ALP SERPL-CCNC: 103 U/L (ref 39–117)
ALT SERPL W P-5'-P-CCNC: 7 U/L (ref 1–33)
ANION GAP SERPL CALCULATED.3IONS-SCNC: 13.6 MMOL/L (ref 5–15)
AST SERPL-CCNC: 18 U/L (ref 1–32)
BASOPHILS # BLD AUTO: 0.05 10*3/MM3 (ref 0–0.2)
BASOPHILS NFR BLD AUTO: 0.7 % (ref 0–1.5)
BILIRUB SERPL-MCNC: <0.2 MG/DL (ref 0–1.2)
BUN SERPL-MCNC: 23.3 MG/DL (ref 6–20)
BUN/CREAT SERPL: 11.7 (ref 7–25)
CALCIUM SPEC-SCNC: 9.6 MG/DL (ref 8.6–10.5)
CHLORIDE SERPL-SCNC: 98 MMOL/L (ref 98–107)
CO2 SERPL-SCNC: 23.4 MMOL/L (ref 22–29)
CREAT SERPL-MCNC: 1.99 MG/DL (ref 0.57–1)
DEPRECATED RDW RBC AUTO: 47.5 FL (ref 37–54)
EGFRCR SERPLBLD CKD-EPI 2021: 31.3 ML/MIN/1.73
EOSINOPHIL # BLD AUTO: 0.24 10*3/MM3 (ref 0–0.4)
EOSINOPHIL NFR BLD AUTO: 3.4 % (ref 0.3–6.2)
ERYTHROCYTE [DISTWIDTH] IN BLOOD BY AUTOMATED COUNT: 13.5 % (ref 12.3–15.4)
GLOBULIN UR ELPH-MCNC: 3.9 GM/DL
GLUCOSE SERPL-MCNC: 96 MG/DL (ref 65–99)
HCT VFR BLD AUTO: 29 % (ref 34–46.6)
HGB BLD-MCNC: 9.4 G/DL (ref 12–15.9)
IMM GRANULOCYTES # BLD AUTO: 0.03 10*3/MM3 (ref 0–0.05)
IMM GRANULOCYTES NFR BLD AUTO: 0.4 % (ref 0–0.5)
LYMPHOCYTES # BLD AUTO: 1.25 10*3/MM3 (ref 0.7–3.1)
LYMPHOCYTES NFR BLD AUTO: 17.7 % (ref 19.6–45.3)
MCH RBC QN AUTO: 31 PG (ref 26.6–33)
MCHC RBC AUTO-ENTMCNC: 32.4 G/DL (ref 31.5–35.7)
MCV RBC AUTO: 95.7 FL (ref 79–97)
MONOCYTES # BLD AUTO: 0.31 10*3/MM3 (ref 0.1–0.9)
MONOCYTES NFR BLD AUTO: 4.4 % (ref 5–12)
NEUTROPHILS NFR BLD AUTO: 5.18 10*3/MM3 (ref 1.7–7)
NEUTROPHILS NFR BLD AUTO: 73.4 % (ref 42.7–76)
NRBC BLD AUTO-RTO: 0 /100 WBC (ref 0–0.2)
PLATELET # BLD AUTO: 364 10*3/MM3 (ref 140–450)
PMV BLD AUTO: 9.5 FL (ref 6–12)
POTASSIUM SERPL-SCNC: 4.2 MMOL/L (ref 3.5–5.2)
PROT SERPL-MCNC: 8.2 G/DL (ref 6–8.5)
RBC # BLD AUTO: 3.03 10*6/MM3 (ref 3.77–5.28)
SODIUM SERPL-SCNC: 135 MMOL/L (ref 136–145)
WBC NRBC COR # BLD AUTO: 7.06 10*3/MM3 (ref 3.4–10.8)

## 2025-05-27 PROCEDURE — 25810000003 SODIUM CHLORIDE 0.9 % SOLUTION: Performed by: INTERNAL MEDICINE

## 2025-05-27 PROCEDURE — 85025 COMPLETE CBC W/AUTO DIFF WBC: CPT | Performed by: INTERNAL MEDICINE

## 2025-05-27 PROCEDURE — 96413 CHEMO IV INFUSION 1 HR: CPT

## 2025-05-27 PROCEDURE — 80053 COMPREHEN METABOLIC PANEL: CPT | Performed by: INTERNAL MEDICINE

## 2025-05-27 PROCEDURE — 99214 OFFICE O/P EST MOD 30 MIN: CPT | Performed by: INTERNAL MEDICINE

## 2025-05-27 PROCEDURE — 25010000002 PEMBROLIZUMAB 100 MG/4ML SOLUTION 4 ML VIAL: Performed by: INTERNAL MEDICINE

## 2025-05-27 PROCEDURE — 1126F AMNT PAIN NOTED NONE PRSNT: CPT | Performed by: INTERNAL MEDICINE

## 2025-05-27 RX ORDER — SODIUM CHLORIDE 9 MG/ML
20 INJECTION, SOLUTION INTRAVENOUS ONCE
Status: CANCELLED | OUTPATIENT
Start: 2025-05-27

## 2025-05-27 RX ORDER — SODIUM CHLORIDE 9 MG/ML
20 INJECTION, SOLUTION INTRAVENOUS ONCE
Status: COMPLETED | OUTPATIENT
Start: 2025-05-27 | End: 2025-05-27

## 2025-05-27 RX ADMIN — SODIUM CHLORIDE 200 MG: 9 INJECTION, SOLUTION INTRAVENOUS at 13:57

## 2025-05-27 RX ADMIN — SODIUM CHLORIDE 20 ML/HR: 9 INJECTION, SOLUTION INTRAVENOUS at 13:57

## 2025-05-27 NOTE — PROGRESS NOTES
Chief Complaint/Reason for Referral:   Low hemoglobin/Low hematocrit    Provider, No Known  Cecily Nicholas APRN    Records Obtained:  Records of the patients history including those obtained from [facility] EMR were reviewed and summarized in detail.    Subjective    History of Present Illness    Katherine Rodriguez 44 y.o. presents to [facility] for RCC    Very pleasant 44-year-old female presents to the hematology department for further evaluation of her normocytic anemia and left RCC.  Hx of CKD IIIb. No abd pain, she does have heavy menses cycle for many years.  Has had PAP at health dept.     Patient had radical left nephrectomy on 6 December 2024.  This showed clear-cell renal cell carcinoma with 0 of 17 lymph nodes involved.  Renal vein margin positive for carcinoma.  Adrenal gland with no malignancy.  Pathologic pT3b N0.  Grade 4.  Stage III.    Patient has recovered well from surgery.  Denies any bleeding.  Still has stent in place.  Has seen Dr. Deal with urology for follow-up.  Has follow-up planned on the 18th as well.  No acute concerns today.  Agreeable to starting immunotherapy for her RCC.     Started adjuvant pembrolizumab 2/24/2025.     Interval history  Patient presents for follow-up.  She is due for fifth cycle of pembrolizumab.   Still has right ureteral stent placed.  Kidney numbers have improved.  Creatinine down to 1.99 today.  Due to see Dr. Deal's office for follow-up for the stent early June. Continues to have rash with the immunotherapy. It seems that it is getting worse.  It does resolve without use of steroid cream.  She is asking if she can use Benadryl.  She denies any abdominal pain.  She is making urine.  Denies any hematuria.  No fevers or chills reported.  No nausea or vomiting.  No chest pain or cough reported.  Ready for treatment.  CT scan showed 2 pelvic fluid collections and worsening abdominal adenopathy.  Results discussed.  Will get  PET scan.    Hematology History  Patient's most recent CBC reveals a white blood cell count of 8.9, hemoglobin of 8.7 hematocrit 26.9, MCV of 88, platelet count of 410.  In March 2024 patient's hemoglobin was 12.6 on October 1 patient's hemoglobin was 9.3.    October 7, 2024: FOBT negative    10/29/24: WBC 8.29, hgb 8.2, plt 391. Ferritin 606,  iron sat 7%, TIBC 361. LDH and hapto elevated. B12 normal at 598, folate low at 3.4 (supplementation started)  Hemochromatosis testing: c.845G>A (p.[id number]) - Detected, heterozygous   c.187C>G (p.Jkk04Ymz) - Detected, heterozygous   c.193A>T (p.Xsp47Emh) - Not Detected   Holding phlebotomy due to low iron sat.    3/21/25: Ferritin 266, iron sat 27%         Cancer-related family history includes Breast cancer in her paternal aunt. There is no history of Ovarian cancer, Uterine cancer, or Colon cancer.     Oncology/Hematology History   Renal cell carcinoma of left kidney   2/4/2025 Initial Diagnosis    Renal cell carcinoma of left kidney     2/4/2025 Cancer Staged    Staging form: Kidney, AJCC 8th Edition  - Pathologic: Stage III (pT3b, pN0, cM0) - Signed by Guy Layne MD on 2/4/2025 2/24/2025 -  Chemotherapy    OP Pembrolizumab 200 mg     3/17/2025 -  Chemotherapy    OP SUPPORTIVE HYDRATION + ANTIEMETICS       Review of Systems   Constitutional:  Negative for appetite change, diaphoresis, fatigue, fever, unexpected weight gain and unexpected weight loss.   HENT: Negative.  Negative for hearing loss, mouth sores, sore throat, swollen glands, trouble swallowing and voice change.    Eyes: Negative.  Negative for blurred vision.   Respiratory: Negative.  Negative for cough, shortness of breath and wheezing.    Cardiovascular: Negative.  Negative for chest pain and palpitations.   Gastrointestinal: Negative.  Negative for abdominal pain, blood in stool, constipation, diarrhea, nausea and vomiting.   Endocrine: Negative.  Negative for cold intolerance and heat  intolerance.   Genitourinary: Negative.  Negative for difficulty urinating, dysuria, frequency, hematuria and urinary incontinence.   Musculoskeletal: Negative.  Negative for arthralgias, back pain and myalgias.   Skin: Negative.  Negative for rash, skin lesions and wound.   Allergic/Immunologic: Negative.    Neurological: Negative.  Negative for dizziness, seizures, weakness, numbness and headache.   Hematological: Negative.  Does not bruise/bleed easily.   Psychiatric/Behavioral:  Negative for depressed mood. The patient is nervous/anxious.    All other systems reviewed and are negative.    Current Outpatient Medications on File Prior to Visit   Medication Sig Dispense Refill    amLODIPine (NORVASC) 10 MG tablet Take 1 tablet by mouth Daily.      buPROPion SR (WELLBUTRIN SR) 150 MG 12 hr tablet Take 1 tablet by mouth Daily. 30 tablet 3    Cholecalciferol (Vitamin D) 50 MCG (2000 UT) capsule Take 1 capsule by mouth Daily.      Eliquis 5 MG tablet tablet Take 1 tablet by mouth 2 (Two) Times a Day.      ferrous gluconate (FERGON) 240 (27 FE) MG tablet Take 1 tablet by mouth Every Other Day.      folic acid (FOLVITE) 1 MG tablet Take 1 tablet by mouth daily. 30 tablet 5    hydroCHLOROthiazide 25 MG tablet       lisinopril (PRINIVIL,ZESTRIL) 20 MG tablet Take 1 tablet by mouth Daily. (Patient not taking: Reported on 2025)       No current facility-administered medications on file prior to visit.     No Known Allergies  Past Medical History:   Diagnosis Date    Anxiety     Cancer 2024    Kidney cancer. Took out my left kidney.    History of transfusion 2024    Had to get blood for surgery and after for my hemoglobin being low.    Hypertension     Multiple gestation 1999    Normocytic anemia     Trichomonas vaginalis infection 2024    Varicella     Whwn i was a younger child.    Vitamin D deficiency      Past Surgical History:   Procedure Laterality Date    BLADDER REPAIR W/  SECTION   "     SECTION  2013    CHOLECYSTECTOMY      CYSTOSCOPY, RETROGRADE PYELOGRAM AND STENT INSERTION Right 3/20/2025    Procedure: CYSTOSCOPY RETROGRADE PYELOGRAM AND STENT INSERTION, right  Plain text: Scope bladder, shoot x-ray, place stent, right;  Surgeon: Veronica Lawrence MD;  Location: [facility] OR;  Service: Urology;  Laterality: Right;    LAPAROSCOPIC CHOLECYSTECTOMY      NEPHRECTOMY RADICAL Left 2024    cancer    TONSILLECTOMY      TUBAL ABDOMINAL LIGATION  2021     Social History     Socioeconomic History    Marital status:    Tobacco Use    Smoking status: Every Day     Current packs/day: 1.50     Average packs/day: 1.5 packs/day for 32.4 years (48.6 ttl pk-yrs)     Types: Cigarettes     Start date: 1993   Vaping Use    Vaping status: Never Used   Substance and Sexual Activity    Alcohol use: Never    Drug use: Never    Sexual activity: Not Currently     Partners: Male     Birth control/protection: Condom, Tubal ligation     Family History   Problem Relation Age of Onset    Hypertension Father     Diabetes Mother     Diabetes Maternal Grandmother     Breast cancer Paternal Aunt     Ovarian cancer Neg Hx     Uterine cancer Neg Hx     Colon cancer Neg Hx     Deep vein thrombosis Neg Hx     Pulmonary embolism Neg Hx      Immunization History   Administered Date(s) Administered    Fluzone (or Fluarix & Flulaval for VFC) >6mos 2020    Tdap 10/20/2020     Tobacco Use: High Risk (2025)    Patient History     Smoking Tobacco Use: Every Day     Smokeless Tobacco Use: Unknown     Passive Exposure: Not on file     Objective     Vitals:    25 1255   BP: 131/79   Pulse: 94   Resp: 18   Temp: 97.6 °F (36.4 °C)   TempSrc: Temporal   SpO2: 100%   Weight: 77.2 kg (170 lb 3.1 oz)   Height: 160 cm (62.99\")   PainSc: 0-No pain          Physical Exam  Vitals and nursing note reviewed.   Constitutional:       General: She is not in acute distress.     " Appearance: Normal appearance. She is not ill-appearing.   HENT:      Head: Normocephalic.   Eyes:      Conjunctiva/sclera: Conjunctivae normal.   Cardiovascular:      Rate and Rhythm: Normal rate and regular rhythm.      Heart sounds: Normal heart sounds.   Pulmonary:      Effort: Pulmonary effort is normal.      Breath sounds: Normal breath sounds.   Abdominal:      Palpations: There is no hepatomegaly or splenomegaly.   Lymphadenopathy:      Cervical: No cervical adenopathy.      Right cervical: No superficial cervical adenopathy.     Left cervical: No superficial cervical adenopathy.      Upper Body:      Right upper body: No axillary adenopathy.      Left upper body: No axillary adenopathy.   Skin:     Coloration: Skin is not jaundiced.   Neurological:      Mental Status: She is alert.   Psychiatric:         Mood and Affect: Mood normal.         Behavior: Behavior normal. Behavior is cooperative.         Thought Content: Thought content normal.         Judgment: Judgment normal.     Wt Readings from Last 3 Encounters:   05/05/25 76.2 kg (167 lb 15.9 oz)   04/14/25 73.7 kg (162 lb 7.7 oz)   04/14/25 73.7 kg (162 lb 7.7 oz)      Body mass index is 30.16 kg/m².              ECOG: (0) Fully Active - Able to Carry On All Pre-disease Performance Without Restriction  Fall Risk Assessment was completed, and patient is at low risk for falls.  PHQ-9 Total Score:         The patient is  experiencing fatigue. Fatigue score: 7    PT/OT Functional Screening:   Speech Functional Screening:   Rehab to be ordered:         Result Review :  The following data was reviewed by: Gyu Layne MD on 05/27/25:    LABS SCANNED (10/17/2024)  LABS SCANNED (10/07/2024)  LABS SCANNED (09/30/2024)  LABS SCANNED (03/22/2024)    Labs personally reviewed.  Notable for creatinine close to baseline at 1.99.  Anemia better but still low with hgb 9.4.  K normal.  CO2 normal.  LFTs normal. WBC normal. Plts normal.     CT scan reports  personally reviewed         Assessment and Plan:  Diagnoses and all orders for this visit:    1. Renal cell carcinoma of left kidney (Primary)  -     NM PET/CT Skull Base to Mid Thigh; Future    2. Hemochromatosis, unspecified hemochromatosis type    3. Anemia, unspecified type    4. CKD stage 3b, GFR 30-44 ml/min    Other orders  -     OK To Treat  -     Cancel: sodium chloride 0.9 % infusion  -     Cancel: Pembrolizumab (KEYTRUDA) 200 mg in sodium chloride 0.9 % 58 mL chemo IVPB          Stage III left RCC  Left radical nephrectomy 12/6/24: Positive margin renal vein.  R1 dissection.  Grade 4.  0 of 17 lymph nodes involved.  Pathologic pT3b N0M0.  Category 1 recommendation for adjuvant pembrolizumab every 3 weeks for 1 year discussed with patient. She started pembrolizumab 2/24/25.     5/21/25: Repeat CT CAP: Fluid collection in the left hemipelvis seen. Also another 5 cm mixed density mass in right hemipelvis. These are likely post-surgical. Patient is asymptomatic. She does have worsened adenopathy which could be related to fluid collections or could be pseudoprogression. However with her stage III RCC, could also be disease progression.  Will get PET scan to evaluate. She is due to see Dr. Deal on 6/10/25 for follow up as well.     5/27/25: C5D1 adjuvant pembrolizumab 200 mg every 3 weeks. Labs appropriate to proceed - ok to treat for her CrCl which has improved.     RTC C6.    Continue to monitor for severe toxicities with frequent labs and office visits.    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 Referred to nephrology. Recent GURINDER due to obstruction. Ureteral stent placed in hospital with improvement in creatinine. 4/14/25: Creatinine close to baseline. 5/27/25: Creatinine at baseline. No hydro on recent imaging. Has follow up with Dr. Deal at [facility] early June.     Hair loss  Likely hormonal related as she is also having menstrual  cycle for most days of past month. Thyroid studies normal.    Prolonged menstrual bleeding  Defer to gynecology.     Anemia  Likely related to RCC and CKD and recent procedures. Will need to follow this closely.  Holding iron supplementation due to elevated ferritin and combined heterozygous hemochromatosis mutations positive. Last iron studdies 3/21/25 with ferritin of 266 and iron sat of 27%. Repeat CBC 5/27/25 with improvement to 9.4 g/dL. Likely due in part to her CKD. Can consider EPO but will hold for now.     Hemochromatosis  Patient with elevated ferritin to 600.  This certainly could be related to her chronic disease as well as renal cell carcinoma.  Hemochromatosis testing did detect heterozygous mutations in 2 different mutations.  This typically does not lead to clinically relevant hemochromatosis.  Further patient has low iron saturation which is not consistent with iron overload as this would be elevated. Will need to follow ferritin levels closely. Ferritin already down to 266 as of 3/21/25.  No phlebotomy for now. Will also avoid iron supplementation. Patient does not have elevated LFTs.  We may need to consider MRI of the liver if ferritin increases further.     Folate def  Mild. Agree with daily folate supplementation. B12 normal.       Patient Follow Up: C6 keytruda    I spent 30 minutes caring for Katherine on this date of service. This time includes time spent by me in the following activities:preparing for the visit, reviewing tests, obtaining and/or reviewing a separately obtained history, performing a medically appropriate examination and/or evaluation , counseling and educating the patient/family/caregiver, ordering medications, tests, or procedures, documenting information in the medical record, and independently interpreting results and communicating that information with the patient/family/caregiver    Patient was given instructions and counseling regarding her condition or for health  maintenance advice. Please see specific information pulled into the AVS if appropriate.

## 2025-06-11 ENCOUNTER — TELEPHONE (OUTPATIENT)
Dept: ONCOLOGY | Facility: HOSPITAL | Age: 45
End: 2025-06-11
Payer: COMMERCIAL

## 2025-06-11 RX ORDER — PREDNISONE 10 MG/1
20 TABLET ORAL DAILY
Qty: 20 TABLET | Refills: 0 | Status: SHIPPED | OUTPATIENT
Start: 2025-06-11 | End: 2025-06-21

## 2025-06-11 RX ORDER — CLOBETASOL PROPIONATE 0.5 MG/G
1 CREAM TOPICAL 2 TIMES DAILY
Qty: 30 G | Refills: 3 | Status: SHIPPED | OUTPATIENT
Start: 2025-06-11

## 2025-06-11 NOTE — TELEPHONE ENCOUNTER
The pt called and c/o a rash worse than usual. The pt states that she takes Keytruda infusions and normally has a rash on her back. The pt states that the rash is now also on her hands, feet, arms, & legs. The pt states that she has been taking Benadryl PO and Hydrocortisone topically w/o relief from her itching rash. The pt is asking if she needs to come in to be seen. This nurse informed the pt that she does not and that Dr Layne will be made aware. This nurse informed the pt that someone will call her back.

## 2025-06-12 ENCOUNTER — HOSPITAL ENCOUNTER (OUTPATIENT)
Dept: PET IMAGING | Facility: HOSPITAL | Age: 45
Discharge: HOME | End: 2025-06-12
Payer: COMMERCIAL

## 2025-06-12 DIAGNOSIS — C64.2 RENAL CELL CARCINOMA OF LEFT KIDNEY: ICD-10-CM

## 2025-06-12 PROCEDURE — 34310000005 FLUDEOXYGLUCOSE F18 SOLUTION: Performed by: INTERNAL MEDICINE

## 2025-06-12 PROCEDURE — A9552 F18 FDG: HCPCS | Performed by: INTERNAL MEDICINE

## 2025-06-12 PROCEDURE — 78815 PET IMAGE W/CT SKULL-THIGH: CPT

## 2025-06-12 RX ADMIN — FLUDEOXYGLUCOSE F 18 1 DOSE: 200 INJECTION, SOLUTION INTRAVENOUS at 09:28

## 2025-06-17 ENCOUNTER — OFFICE VISIT (OUTPATIENT)
Dept: ONCOLOGY | Facility: HOSPITAL | Age: 45
End: 2025-06-17
Payer: COMMERCIAL

## 2025-06-17 ENCOUNTER — HOSPITAL ENCOUNTER (OUTPATIENT)
Dept: ONCOLOGY | Facility: HOSPITAL | Age: 45
Discharge: HOME | End: 2025-06-17
Payer: COMMERCIAL

## 2025-06-17 VITALS
TEMPERATURE: 98 F | HEART RATE: 89 BPM | SYSTOLIC BLOOD PRESSURE: 137 MMHG | HEIGHT: 63 IN | DIASTOLIC BLOOD PRESSURE: 81 MMHG | OXYGEN SATURATION: 100 % | BODY MASS INDEX: 29.96 KG/M2 | RESPIRATION RATE: 18 BRPM | WEIGHT: 169.09 LBS

## 2025-06-17 DIAGNOSIS — C64.2 RENAL CELL CARCINOMA OF LEFT KIDNEY: Primary | ICD-10-CM

## 2025-06-17 DIAGNOSIS — E83.119 HEMOCHROMATOSIS, UNSPECIFIED HEMOCHROMATOSIS TYPE: ICD-10-CM

## 2025-06-17 DIAGNOSIS — L27.0 DRUG RASH: ICD-10-CM

## 2025-06-17 DIAGNOSIS — N18.32 CKD STAGE 3B, GFR 30-44 ML/MIN: ICD-10-CM

## 2025-06-17 DIAGNOSIS — N13.9 OBSTRUCTION, UROPATHY: ICD-10-CM

## 2025-06-17 DIAGNOSIS — Z14.8 CARRIER OF HEMOCHROMATOSIS HFE GENE MUTATION: ICD-10-CM

## 2025-06-17 DIAGNOSIS — C64.2 RENAL CELL CARCINOMA OF LEFT KIDNEY: ICD-10-CM

## 2025-06-17 DIAGNOSIS — Z29.89 IMMUNOTHERAPY ENCOUNTER: ICD-10-CM

## 2025-06-17 LAB
ALBUMIN SERPL-MCNC: 4.7 G/DL (ref 3.5–5.2)
ALBUMIN/GLOB SERPL: 1.3 G/DL
ALP SERPL-CCNC: 95 U/L (ref 39–117)
ALT SERPL W P-5'-P-CCNC: 16 U/L (ref 1–33)
ANION GAP SERPL CALCULATED.3IONS-SCNC: 12 MMOL/L (ref 5–15)
AST SERPL-CCNC: 20 U/L (ref 1–32)
BASOPHILS # BLD AUTO: 0.09 10*3/MM3 (ref 0–0.2)
BASOPHILS NFR BLD AUTO: 0.8 % (ref 0–1.5)
BILIRUB SERPL-MCNC: 0.2 MG/DL (ref 0–1.2)
BUN SERPL-MCNC: 31 MG/DL (ref 6–20)
BUN/CREAT SERPL: 15.2 (ref 7–25)
CALCIUM SPEC-SCNC: 9.1 MG/DL (ref 8.6–10.5)
CHLORIDE SERPL-SCNC: 101 MMOL/L (ref 98–107)
CO2 SERPL-SCNC: 23 MMOL/L (ref 22–29)
CREAT SERPL-MCNC: 2.04 MG/DL (ref 0.57–1)
DEPRECATED RDW RBC AUTO: 49.1 FL (ref 37–54)
EGFRCR SERPLBLD CKD-EPI 2021: 30.3 ML/MIN/1.73
EOSINOPHIL # BLD AUTO: 0.06 10*3/MM3 (ref 0–0.4)
EOSINOPHIL NFR BLD AUTO: 0.5 % (ref 0.3–6.2)
ERYTHROCYTE [DISTWIDTH] IN BLOOD BY AUTOMATED COUNT: 14.3 % (ref 12.3–15.4)
FERRITIN SERPL-MCNC: 128.6 NG/ML (ref 13–150)
GLOBULIN UR ELPH-MCNC: 3.7 GM/DL
GLUCOSE SERPL-MCNC: 121 MG/DL (ref 65–99)
HCT VFR BLD AUTO: 35.7 % (ref 34–46.6)
HGB BLD-MCNC: 11.6 G/DL (ref 12–15.9)
IMM GRANULOCYTES # BLD AUTO: 0.15 10*3/MM3 (ref 0–0.05)
IMM GRANULOCYTES NFR BLD AUTO: 1.3 % (ref 0–0.5)
LYMPHOCYTES # BLD AUTO: 1.15 10*3/MM3 (ref 0.7–3.1)
LYMPHOCYTES NFR BLD AUTO: 9.8 % (ref 19.6–45.3)
MCH RBC QN AUTO: 31.1 PG (ref 26.6–33)
MCHC RBC AUTO-ENTMCNC: 32.5 G/DL (ref 31.5–35.7)
MCV RBC AUTO: 95.7 FL (ref 79–97)
MONOCYTES # BLD AUTO: 0.24 10*3/MM3 (ref 0.1–0.9)
MONOCYTES NFR BLD AUTO: 2 % (ref 5–12)
NEUTROPHILS NFR BLD AUTO: 10.1 10*3/MM3 (ref 1.7–7)
NEUTROPHILS NFR BLD AUTO: 85.6 % (ref 42.7–76)
NRBC BLD AUTO-RTO: 0 /100 WBC (ref 0–0.2)
PLATELET # BLD AUTO: 417 10*3/MM3 (ref 140–450)
PMV BLD AUTO: 9.4 FL (ref 6–12)
POTASSIUM SERPL-SCNC: 4.4 MMOL/L (ref 3.5–5.2)
PROT SERPL-MCNC: 8.4 G/DL (ref 6–8.5)
RBC # BLD AUTO: 3.73 10*6/MM3 (ref 3.77–5.28)
SODIUM SERPL-SCNC: 136 MMOL/L (ref 136–145)
WBC NRBC COR # BLD AUTO: 11.79 10*3/MM3 (ref 3.4–10.8)

## 2025-06-17 PROCEDURE — 82728 ASSAY OF FERRITIN: CPT | Performed by: INTERNAL MEDICINE

## 2025-06-17 PROCEDURE — 25010000002 PEMBROLIZUMAB 100 MG/4ML SOLUTION 4 ML VIAL: Performed by: INTERNAL MEDICINE

## 2025-06-17 PROCEDURE — 80053 COMPREHEN METABOLIC PANEL: CPT | Performed by: INTERNAL MEDICINE

## 2025-06-17 PROCEDURE — 25810000003 SODIUM CHLORIDE 0.9 % SOLUTION: Performed by: INTERNAL MEDICINE

## 2025-06-17 PROCEDURE — 85025 COMPLETE CBC W/AUTO DIFF WBC: CPT | Performed by: INTERNAL MEDICINE

## 2025-06-17 PROCEDURE — 96413 CHEMO IV INFUSION 1 HR: CPT

## 2025-06-17 RX ORDER — SODIUM CHLORIDE 9 MG/ML
20 INJECTION, SOLUTION INTRAVENOUS ONCE
Status: COMPLETED | OUTPATIENT
Start: 2025-06-17 | End: 2025-06-17

## 2025-06-17 RX ORDER — CLINDAMYCIN HYDROCHLORIDE 300 MG/1
CAPSULE ORAL
COMMUNITY
Start: 2025-05-29

## 2025-06-17 RX ORDER — SODIUM CHLORIDE 9 MG/ML
20 INJECTION, SOLUTION INTRAVENOUS ONCE
Status: CANCELLED | OUTPATIENT
Start: 2025-06-17

## 2025-06-17 RX ADMIN — SODIUM CHLORIDE 200 MG: 9 INJECTION, SOLUTION INTRAVENOUS at 14:19

## 2025-06-17 RX ADMIN — SODIUM CHLORIDE 20 ML/HR: 9 INJECTION, SOLUTION INTRAVENOUS at 14:02

## 2025-06-17 NOTE — PROGRESS NOTES
Chief Complaint/Reason for Referral:   Low hemoglobin/Low hematocrit-    Provider, No Known  Cecily Nicholas, RICARDO    Records Obtained:  Records of the patients history including those obtained from [facility] EMR were reviewed and summarized in detail.    Subjective    History of Present Illness    Katherine Rodriguez 44 y.o. presents to [facility] for RCC    Very pleasant 44-year-old female presents to the hematology department for further evaluation of her normocytic anemia and left RCC.  Hx of CKD IIIb. No abd pain, she does have heavy menses cycle for many years.  Has had PAP at health dept.     Patient had radical left nephrectomy on 6 December 2024.  This showed clear-cell renal cell carcinoma with 0 of 17 lymph nodes involved.  Renal vein margin positive for carcinoma.  Adrenal gland with no malignancy.  Pathologic pT3b N0.  Grade 4.  Stage III.    Patient has recovered well from surgery.  Denies any bleeding.  Still has stent in place.  Has seen Dr. Deal with urology for follow-up.  Has follow-up planned on the 18th as well.  No acute concerns today.  Agreeable to starting immunotherapy for her RCC.     Started adjuvant pembrolizumab 2/24/2025.     5/21/25: Repeat CT CAP: Fluid collection in the left hemipelvis seen. Also another 5 cm mixed density mass in right hemipelvis. These are likely post-surgical. Patient is asymptomatic. She does have worsened adenopathy which could be related to fluid collections or could be pseudoprogression.     5/27/25: C5 Pembro. Complicated by worse rash. Required oral prednisone 20 mg and had resolution.     6/9/25: PET scan obtained 6/9/12 and no definitive metastatic disease was seen.  No FDG avidity associated lymph nodes seen.  Heterogeneous mixed density soft tissue within right lower quadrant likely represents postoperative changes.  Results discussed.  Will plan to continue with adjuvant treatment.    6/17/25: C6D1 adjuvant  pembrolizumab 200 mg every 3 weeks.     Interval history  Patient presents for follow-up.  She is due for sixth cycle of pembrolizumab. Creatinine stable at 2.0 today.  Reports her itchy rash worsened after cycle 5.  It was diffuse all over her body.  She required prednisone 20 mg for 10 days.  Reports resolution of her rash with this.  Patient had follow-up with Dr. Deal. He replaced ureteral stent. She tolerated this well. She denies any abdominal pain.   No fevers or chills reported.  No nausea or vomiting.  No cough or diarrhea reported. PET scan performed due to adenopathy seen on CT abdomen and PET showed no concerning areas of metastatic disease.  Results discussed with patient. She is ready for treatment.     Hematology History  Patient's most recent CBC reveals a white blood cell count of 8.9, hemoglobin of 8.7 hematocrit 26.9, MCV of 88, platelet count of 410.  In March 2024 patient's hemoglobin was 12.6 on October 1 patient's hemoglobin was 9.3.    October 7, 2024: FOBT negative    10/29/24: WBC 8.29, hgb 8.2, plt 391. Ferritin 606,  iron sat 7%, TIBC 361. LDH and hapto elevated. B12 normal at 598, folate low at 3.4 (supplementation started)  Hemochromatosis testing: c.845G>A (p.Shu179Scv) - Detected, heterozygous   c.187C>G (p.Sbj42Xjb) - Detected, heterozygous   c.193A>T (p.Xdw53Nfj) - Not Detected   Holding phlebotomy due to low iron sat.    3/21/25: Ferritin 266, iron sat 27%         Cancer-related family history includes Breast cancer in her paternal aunt. There is no history of Ovarian cancer, Uterine cancer, or Colon cancer.     Oncology/Hematology History   Renal cell carcinoma of left kidney   2/4/2025 Initial Diagnosis    Renal cell carcinoma of left kidney     2/4/2025 Cancer Staged    Staging form: Kidney, AJCC 8th Edition  - Pathologic: Stage III (pT3b, pN0, cM0) - Signed by Guy Layne MD on 2/4/2025 2/24/2025 -  Chemotherapy    OP Pembrolizumab 200 mg     3/17/2025 -   Chemotherapy    OP SUPPORTIVE HYDRATION + ANTIEMETICS       Review of Systems   Constitutional:  Negative for appetite change, diaphoresis, fatigue, fever, unexpected weight gain and unexpected weight loss.   HENT: Negative.  Negative for hearing loss, mouth sores, sore throat, swollen glands, trouble swallowing and voice change.    Eyes: Negative.  Negative for blurred vision.   Respiratory: Negative.  Negative for cough, shortness of breath and wheezing.    Cardiovascular: Negative.  Negative for chest pain and palpitations.   Gastrointestinal: Negative.  Negative for abdominal pain, blood in stool, constipation, diarrhea, nausea and vomiting.   Endocrine: Negative.  Negative for cold intolerance and heat intolerance.   Genitourinary: Negative.  Negative for difficulty urinating, dysuria, frequency, hematuria and urinary incontinence.   Musculoskeletal: Negative.  Negative for arthralgias, back pain and myalgias.   Skin: Negative.  Negative for rash, skin lesions and wound.   Allergic/Immunologic: Negative.    Neurological: Negative.  Negative for dizziness, seizures, weakness, numbness and headache.   Hematological: Negative.  Does not bruise/bleed easily.   Psychiatric/Behavioral:  Negative for depressed mood. The patient is nervous/anxious.    All other systems reviewed and are negative.    Current Outpatient Medications on File Prior to Visit   Medication Sig Dispense Refill    clindamycin (CLEOCIN) 300 MG capsule       amLODIPine (NORVASC) 10 MG tablet Take 1 tablet by mouth Daily.      buPROPion SR (WELLBUTRIN SR) 150 MG 12 hr tablet Take 1 tablet by mouth Daily. 30 tablet 3    Cholecalciferol (Vitamin D) 50 MCG (2000 UT) capsule Take 1 capsule by mouth Daily.      clobetasol propionate (TEMOVATE) 0.05 % cream Apply 1 Application topically to the appropriate area as directed 2 (Two) Times a Day. 30 g 3    Eliquis 5 MG tablet tablet Take 1 tablet by mouth 2 (Two) Times a Day.      folic acid (FOLVITE) 1 MG  tablet Take 1 tablet by mouth daily. 30 tablet 5    hydroCHLOROthiazide 25 MG tablet       lisinopril (PRINIVIL,ZESTRIL) 20 MG tablet Take 1 tablet by mouth Daily. (Patient not taking: Reported on 2025)      predniSONE (DELTASONE) 10 MG tablet Take 2 tablets by mouth Daily for 10 days. 20 tablet 0     No current facility-administered medications on file prior to visit.     No Known Allergies  Past Medical History:   Diagnosis Date    Anxiety     Cancer 2024    Kidney cancer. Took out my left kidney.    History of transfusion 2024    Had to get blood for surgery and after for my hemoglobin being low.    Hypertension     Multiple gestation 1999    Normocytic anemia     Trichomonas vaginalis infection 2024    Varicella     Whwn i was a younger child.    Vitamin D deficiency      Past Surgical History:   Procedure Laterality Date    BLADDER REPAIR W/  SECTION       SECTION  2013    CHOLECYSTECTOMY      CYSTOSCOPY, RETROGRADE PYELOGRAM AND STENT INSERTION Right 3/20/2025    Procedure: CYSTOSCOPY RETROGRADE PYELOGRAM AND STENT INSERTION, right  Plain text: Scope bladder, shoot x-ray, place stent, right;  Surgeon: Veronica Lawrence MD;  Location: [facility] OR;  Service: Urology;  Laterality: Right;    LAPAROSCOPIC CHOLECYSTECTOMY      NEPHRECTOMY RADICAL Left 2024    cancer    TONSILLECTOMY      TUBAL ABDOMINAL LIGATION  2021     Social History     Socioeconomic History    Marital status:    Tobacco Use    Smoking status: Every Day     Current packs/day: 1.50     Average packs/day: 1.5 packs/day for 32.5 years (48.7 ttl pk-yrs)     Types: Cigarettes     Start date: 1993   Vaping Use    Vaping status: Never Used   Substance and Sexual Activity    Alcohol use: Never    Drug use: Never    Sexual activity: Not Currently     Partners: Male     Birth control/protection: Condom, Tubal ligation     Family History   Problem Relation Age  "of Onset    Hypertension Father     Diabetes Mother     Diabetes Maternal Grandmother     Breast cancer Paternal Aunt     Ovarian cancer Neg Hx     Uterine cancer Neg Hx     Colon cancer Neg Hx     Deep vein thrombosis Neg Hx     Pulmonary embolism Neg Hx      Immunization History   Administered Date(s) Administered    Fluzone (or Fluarix & Flulaval for VFC) >6mos 09/23/2020    Tdap 10/20/2020     Tobacco Use: High Risk (6/17/2025)    Patient History     Smoking Tobacco Use: Every Day     Smokeless Tobacco Use: Unknown     Passive Exposure: Not on file     Objective     Vitals:    06/17/25 1317   BP: 137/81   Pulse: 89   Resp: 18   Temp: 98 °F (36.7 °C)   TempSrc: Temporal   SpO2: 100%   Weight: 76.7 kg (169 lb 1.5 oz)   Height: 160 cm (62.99\")   PainSc: 0-No pain            Physical Exam  Vitals and nursing note reviewed.   Constitutional:       General: She is not in acute distress.     Appearance: Normal appearance. She is not ill-appearing.   HENT:      Head: Normocephalic.   Eyes:      Conjunctiva/sclera: Conjunctivae normal.   Cardiovascular:      Rate and Rhythm: Normal rate and regular rhythm.      Heart sounds: Normal heart sounds.   Pulmonary:      Effort: Pulmonary effort is normal.      Breath sounds: Normal breath sounds.   Abdominal:      Palpations: There is no hepatomegaly or splenomegaly.   Lymphadenopathy:      Cervical: No cervical adenopathy.      Right cervical: No superficial cervical adenopathy.     Left cervical: No superficial cervical adenopathy.      Upper Body:      Right upper body: No axillary adenopathy.      Left upper body: No axillary adenopathy.   Skin:     Coloration: Skin is not jaundiced.   Neurological:      Mental Status: She is alert.   Psychiatric:         Mood and Affect: Mood normal.         Behavior: Behavior normal. Behavior is cooperative.         Thought Content: Thought content normal.         Judgment: Judgment normal.       Wt Readings from Last 3 Encounters: "   05/27/25 77.2 kg (170 lb 3.1 oz)   05/05/25 76.2 kg (167 lb 15.9 oz)   04/14/25 73.7 kg (162 lb 7.7 oz)      Body mass index is 29.96 kg/m².              ECOG: (0) Fully Active - Able to Carry On All Pre-disease Performance Without Restriction  Fall Risk Assessment was completed, and patient is at low risk for falls.  PHQ-9 Total Score:         The patient is  experiencing fatigue. Fatigue score: 7    PT/OT Functional Screening:   Speech Functional Screening:   Rehab to be ordered:         Result Review :  The following data was reviewed by: Guy Layne MD on 06/17/25:    LABS SCANNED (10/17/2024)  LABS SCANNED (10/07/2024)  LABS SCANNED (09/30/2024)  LABS SCANNED (03/22/2024)    Labs personally reviewed.  Notable for creatinine close to baseline at 2.04.  Anemia better at 11.6.  K normal.  CO2 normal.  LFTs normal. WBC normal. Plts normal.     PET personally reviewed and no abnormal hypermetabolic areas of concern per my read         Assessment and Plan:  Diagnoses and all orders for this visit:    1. Renal cell carcinoma of left kidney (Primary)    2. Obstruction, uropathy    3. Drug rash    4. Immunotherapy encounter    5. CKD stage 3b, GFR 30-44 ml/min    6. Carrier of hemochromatosis HFE gene mutation    7. Hemochromatosis, unspecified hemochromatosis type  -     Ferritin; Future    Other orders  -     Cancel: sodium chloride 0.9 % infusion  -     Cancel: Pembrolizumab (KEYTRUDA) 200 mg in sodium chloride 0.9 % 58 mL chemo IVPB  -     OK To Treat        Stage III left RCC  Left radical nephrectomy 12/6/24: Positive margin renal vein.  R1 dissection.  Grade 4.  0 of 17 lymph nodes involved.  Pathologic pT3b N0M0.  Category 1 recommendation for adjuvant pembrolizumab every 3 weeks for 1 year discussed with patient. She started pembrolizumab 2/24/25.     5/21/25: Repeat CT CAP: Fluid collection in the left hemipelvis seen. Also another 5 cm mixed density mass in right hemipelvis. These are likely  post-surgical. Patient is asymptomatic. She does have worsened adenopathy which could be related to fluid collections or could be pseudoprogression. PET scan obtained 6/9/12 and no definitive metastatic disease was seen.  No FDG avidity associated lymph nodes seen.  Heterogeneous mixed density soft tissue within right lower quadrant likely represents postoperative changes.  Results discussed.  Will plan to continue with current adjuvant treatment.    6/17/25: C6D1 adjuvant pembrolizumab 200 mg every 3 weeks. Labs appropriate to proceed - ok to treat for her CrCl which has improved.     RTC C7.    Continue to monitor for severe toxicities with frequent labs and office visits.    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 Referred to nephrology. Recent GURINDER due to obstruction. Ureteral stent placed in hospital with improvement in creatinine. 4/14/25: Creatinine close to baseline. No hydro on recent imaging. Recently had stent replaced by Dr. Deal 6/2025. 6/17/25 creatinine stable.     Anemia  Likely related to RCC and CKD and recent procedures. Will need to follow this closely.  Holding iron supplementation due to elevated ferritin and combined heterozygous hemochromatosis mutations positive. Last iron studdies 3/21/25 with ferritin of 266 and iron sat of 27%. Repeat CBC 6/17/25 with improved hgb to >11 g/dL.     Hemochromatosis  Patient with elevated ferritin to 600.  This certainly could be related to her chronic disease as well as renal cell carcinoma.  Hemochromatosis testing did detect heterozygous mutations in 2 different mutations.  This typically does not lead to clinically relevant hemochromatosis.  Further patient has low iron saturation which is not consistent with iron overload as this would be elevated. Will need to follow ferritin levels closely. Ferritin already down to 266 as of 3/21/25.  Repeat 6/17/25. No phlebotomy for now.  Will also avoid iron supplementation. Patient does not have elevated LFTs.  We may need to consider MRI of the liver if ferritin increases further.     Folate def  Mild. Agree with daily folate supplementation. B12 normal.     Hair loss  Likely hormonal related as she is also having menstrual cycle for most days of past month. Thyroid studies normal.    Prolonged menstrual bleeding  Defer to gynecology.       Patient Follow Up: C7 keytruda        Patient was given instructions and counseling regarding her condition or for health maintenance advice. Please see specific information pulled into the AVS if appropriate.

## 2025-07-08 ENCOUNTER — HOSPITAL ENCOUNTER (OUTPATIENT)
Dept: ONCOLOGY | Facility: HOSPITAL | Age: 45
Discharge: HOME | End: 2025-07-08
Payer: COMMERCIAL

## 2025-07-08 ENCOUNTER — OFFICE VISIT (OUTPATIENT)
Dept: ONCOLOGY | Facility: HOSPITAL | Age: 45
End: 2025-07-08
Payer: COMMERCIAL

## 2025-07-08 VITALS
BODY MASS INDEX: 29.41 KG/M2 | SYSTOLIC BLOOD PRESSURE: 122 MMHG | HEIGHT: 63 IN | OXYGEN SATURATION: 100 % | DIASTOLIC BLOOD PRESSURE: 78 MMHG | RESPIRATION RATE: 18 BRPM | WEIGHT: 166.01 LBS | TEMPERATURE: 98.2 F | HEART RATE: 77 BPM

## 2025-07-08 DIAGNOSIS — N18.32 CKD STAGE 3B, GFR 30-44 ML/MIN: ICD-10-CM

## 2025-07-08 DIAGNOSIS — Z14.8 CARRIER OF HEMOCHROMATOSIS HFE GENE MUTATION: ICD-10-CM

## 2025-07-08 DIAGNOSIS — D63.8 ANEMIA, CHRONIC DISEASE: ICD-10-CM

## 2025-07-08 DIAGNOSIS — C64.2 RENAL CELL CARCINOMA OF LEFT KIDNEY: Primary | ICD-10-CM

## 2025-07-08 DIAGNOSIS — E53.8 FOLATE DEFICIENCY: ICD-10-CM

## 2025-07-08 LAB
ALBUMIN SERPL-MCNC: 4.7 G/DL (ref 3.5–5.2)
ALBUMIN/GLOB SERPL: 1.3 G/DL
ALP SERPL-CCNC: 106 U/L (ref 39–117)
ALT SERPL W P-5'-P-CCNC: 17 U/L (ref 1–33)
ANION GAP SERPL CALCULATED.3IONS-SCNC: 13.4 MMOL/L (ref 5–15)
AST SERPL-CCNC: 28 U/L (ref 1–32)
BASOPHILS # BLD AUTO: 0.04 10*3/MM3 (ref 0–0.2)
BASOPHILS NFR BLD AUTO: 0.7 % (ref 0–1.5)
BILIRUB SERPL-MCNC: 0.2 MG/DL (ref 0–1.2)
BUN SERPL-MCNC: 20.4 MG/DL (ref 6–20)
BUN/CREAT SERPL: 11.7 (ref 7–25)
CALCIUM SPEC-SCNC: 10.1 MG/DL (ref 8.6–10.5)
CHLORIDE SERPL-SCNC: 99 MMOL/L (ref 98–107)
CO2 SERPL-SCNC: 21.6 MMOL/L (ref 22–29)
CREAT SERPL-MCNC: 1.74 MG/DL (ref 0.57–1)
DEPRECATED RDW RBC AUTO: 47.8 FL (ref 37–54)
EGFRCR SERPLBLD CKD-EPI 2021: 36.7 ML/MIN/1.73
EOSINOPHIL # BLD AUTO: 0.47 10*3/MM3 (ref 0–0.4)
EOSINOPHIL NFR BLD AUTO: 7.7 % (ref 0.3–6.2)
ERYTHROCYTE [DISTWIDTH] IN BLOOD BY AUTOMATED COUNT: 14.1 % (ref 12.3–15.4)
GLOBULIN UR ELPH-MCNC: 3.5 GM/DL
GLUCOSE SERPL-MCNC: 83 MG/DL (ref 65–99)
HCT VFR BLD AUTO: 32.1 % (ref 34–46.6)
HGB BLD-MCNC: 10.8 G/DL (ref 12–15.9)
IMM GRANULOCYTES # BLD AUTO: 0.03 10*3/MM3 (ref 0–0.05)
IMM GRANULOCYTES NFR BLD AUTO: 0.5 % (ref 0–0.5)
LYMPHOCYTES # BLD AUTO: 1.2 10*3/MM3 (ref 0.7–3.1)
LYMPHOCYTES NFR BLD AUTO: 19.7 % (ref 19.6–45.3)
MCH RBC QN AUTO: 31.2 PG (ref 26.6–33)
MCHC RBC AUTO-ENTMCNC: 33.6 G/DL (ref 31.5–35.7)
MCV RBC AUTO: 92.8 FL (ref 79–97)
MONOCYTES # BLD AUTO: 0.34 10*3/MM3 (ref 0.1–0.9)
MONOCYTES NFR BLD AUTO: 5.6 % (ref 5–12)
NEUTROPHILS NFR BLD AUTO: 4.02 10*3/MM3 (ref 1.7–7)
NEUTROPHILS NFR BLD AUTO: 65.8 % (ref 42.7–76)
NRBC BLD AUTO-RTO: 0 /100 WBC (ref 0–0.2)
PLATELET # BLD AUTO: 271 10*3/MM3 (ref 140–450)
PMV BLD AUTO: 10 FL (ref 6–12)
POTASSIUM SERPL-SCNC: 4.6 MMOL/L (ref 3.5–5.2)
PROT SERPL-MCNC: 8.2 G/DL (ref 6–8.5)
RBC # BLD AUTO: 3.46 10*6/MM3 (ref 3.77–5.28)
SODIUM SERPL-SCNC: 134 MMOL/L (ref 136–145)
T4 FREE SERPL-MCNC: 1.56 NG/DL (ref 0.92–1.68)
TSH SERPL DL<=0.05 MIU/L-ACNC: 1.52 UIU/ML (ref 0.27–4.2)
WBC NRBC COR # BLD AUTO: 6.1 10*3/MM3 (ref 3.4–10.8)

## 2025-07-08 PROCEDURE — 25010000002 PEMBROLIZUMAB 100 MG/4ML SOLUTION 4 ML VIAL: Performed by: INTERNAL MEDICINE

## 2025-07-08 PROCEDURE — 80053 COMPREHEN METABOLIC PANEL: CPT | Performed by: INTERNAL MEDICINE

## 2025-07-08 PROCEDURE — 84439 ASSAY OF FREE THYROXINE: CPT | Performed by: INTERNAL MEDICINE

## 2025-07-08 PROCEDURE — 96413 CHEMO IV INFUSION 1 HR: CPT

## 2025-07-08 PROCEDURE — 85025 COMPLETE CBC W/AUTO DIFF WBC: CPT | Performed by: INTERNAL MEDICINE

## 2025-07-08 PROCEDURE — 25810000003 SODIUM CHLORIDE 0.9 % SOLUTION: Performed by: INTERNAL MEDICINE

## 2025-07-08 PROCEDURE — 84443 ASSAY THYROID STIM HORMONE: CPT | Performed by: INTERNAL MEDICINE

## 2025-07-08 RX ORDER — SODIUM CHLORIDE 9 MG/ML
20 INJECTION, SOLUTION INTRAVENOUS ONCE
Status: CANCELLED | OUTPATIENT
Start: 2025-07-08

## 2025-07-08 RX ORDER — BUPROPION HYDROCHLORIDE 150 MG/1
TABLET, EXTENDED RELEASE ORAL
COMMUNITY
Start: 2025-07-07

## 2025-07-08 RX ORDER — HYDROCHLOROTHIAZIDE 25 MG/1
TABLET ORAL
COMMUNITY
Start: 2025-06-21

## 2025-07-08 RX ORDER — QUINIDINE GLUCONATE 324 MG
TABLET, EXTENDED RELEASE ORAL
COMMUNITY
Start: 2025-06-21

## 2025-07-08 RX ORDER — SODIUM CHLORIDE 9 MG/ML
20 INJECTION, SOLUTION INTRAVENOUS ONCE
Status: COMPLETED | OUTPATIENT
Start: 2025-07-08 | End: 2025-07-08

## 2025-07-08 RX ADMIN — SODIUM CHLORIDE 200 MG: 9 INJECTION, SOLUTION INTRAVENOUS at 14:36

## 2025-07-08 RX ADMIN — SODIUM CHLORIDE 20 ML/HR: 9 INJECTION, SOLUTION INTRAVENOUS at 14:36

## 2025-07-08 NOTE — PROGRESS NOTES
Chief Complaint/Reason for Referral:  Low hemoglobin/Low hematocrit    Provider, No Known  Cecily Nicholas APRN    Records Obtained:  Records of the patients history including those obtained from [facility] EMR were reviewed and summarized in detail.    Subjective    History of Present Illness    Katherine Rodriguez 44 y.o. presents to [facility] HEMATOLOGY & ONCOLOGY for RCC    Very pleasant 44-year-old female presents to the hematology department for further evaluation of her normocytic anemia and left RCC.  Hx of CKD IIIb.     Patient had radical left nephrectomy on 6 December 2024 (Dr. Deal at [facility]).  This showed clear-cell renal cell carcinoma with 0 of 17 lymph nodes involved.  Renal vein margin positive for carcinoma.  Adrenal gland with no malignancy.  Pathologic pT3bN0. Grade 4. Stage III. She started adjuvant pembrolizumab 2/24/2025.     Hematology History  Patient's most recent CBC reveals a white blood cell count of 8.9, hemoglobin of 8.7 hematocrit 26.9, MCV of 88, platelet count of 410.  In March 2024 patient's hemoglobin was 12.6 on October 1 patient's hemoglobin was 9.3.    October 7, 2024: FOBT negative    10/29/24: WBC 8.29, hgb 8.2, plt 391. Ferritin 606,  iron sat 7%, TIBC 361. LDH and hapto elevated. B12 normal at 598, folate low at 3.4 (supplementation started)  Hemochromatosis testing: c.845G>A (p.Odx963Wai) - Detected, heterozygous   c.187C>G (p.Ekh23Ydc) - Detected, heterozygous   c.193A>T (p.Vwb97Coj) - Not Detected   Holding phlebotomy due to low iron sat.    3/21/25: Ferritin 266, iron sat 27%     6/17/25: Ferritin normal at 128      Interval history  Patient presents for follow-up.  She is due for seventh cycle of pembrolizumab.  She did not have rash recur after cycle 5.  Previously required oral prednisone for diffuse rash.  Feels well overall.  Denies any new pain.  Denies any diarrhea.  No chest pain or trouble breathing.  No fevers or chills.  No bleeding that is  abnormal.  No blood in the urine.  Repeat ferritin was normal back in June.  Creatinine 1.74 today. She is ready for treatment.         Oncology/Hematology History Overview Note   12/6/24: Patient had radical left nephrectomy per Dr. Deal at [facility].  This showed clear-cell renal cell carcinoma with 0 of 17 lymph nodes involved.  Renal vein margin positive for carcinoma.  Adrenal gland with no malignancy.  Pathologic pT3bN0. Grade 4. Stage III.    2/24/25: Started adjuvant pembrolizumab .     5/21/25: Repeat CT CAP: Fluid collection in the left hemipelvis seen. Also another 5 cm mixed density mass in right hemipelvis. These are likely post-surgical. Patient is asymptomatic. She does have worsened adenopathy which could be related to fluid collections or could be pseudoprogression.     5/27/25: C5 Pembro. Complicated by worse rash. Required oral prednisone 20 mg and had resolution.     6/9/25: PET scan obtained 6/9/25 and no definitive metastatic disease was seen.  No FDG avidity associated lymph nodes seen.  Heterogeneous mixed density soft tissue within right lower quadrant likely represents postoperative changes.  Results discussed.  Will plan to continue with adjuvant treatment.    6/17/25: C6D1 adjuvant pembrolizumab 200 mg every 3 weeks.      Renal cell carcinoma of left kidney   2/4/2025 Initial Diagnosis    Renal cell carcinoma of left kidney     2/4/2025 Cancer Staged    Staging form: Kidney, AJCC 8th Edition  - Pathologic: Stage III (pT3b, pN0, cM0) - Signed by Guy Layne MD on 2/4/2025 2/24/2025 -  Chemotherapy    OP Pembrolizumab 200 mg     3/17/2025 -  Chemotherapy    OP SUPPORTIVE HYDRATION + ANTIEMETICS       Review of Systems   Constitutional:  Negative for appetite change, diaphoresis, fatigue, fever, unexpected weight gain and unexpected weight loss.   HENT: Negative.  Negative for hearing loss, mouth sores, sore throat, swollen glands, trouble swallowing and voice change.    Eyes:  Negative.  Negative for blurred vision.   Respiratory: Negative.  Negative for cough, shortness of breath and wheezing.    Cardiovascular: Negative.  Negative for chest pain and palpitations.   Gastrointestinal: Negative.  Negative for abdominal pain, blood in stool, constipation, diarrhea, nausea and vomiting.   Endocrine: Negative.  Negative for cold intolerance and heat intolerance.   Genitourinary: Negative.  Negative for difficulty urinating, dysuria, frequency, hematuria and urinary incontinence.   Musculoskeletal: Negative.  Negative for arthralgias, back pain and myalgias.   Skin: Negative.  Negative for rash, skin lesions and wound.   Allergic/Immunologic: Negative.    Neurological: Negative.  Negative for dizziness, seizures, weakness, numbness and headache.   Hematological: Negative.  Does not bruise/bleed easily.   Psychiatric/Behavioral:  Negative for depressed mood. The patient is nervous/anxious.    All other systems reviewed and are negative.    Current Outpatient Medications on File Prior to Visit   Medication Sig Dispense Refill    buPROPion SR (WELLBUTRIN SR) 150 MG 12 hr tablet       ferrous gluconate (FERGON) 240 (27 FE) MG tablet       hydroCHLOROthiazide 25 MG tablet       amLODIPine (NORVASC) 10 MG tablet Take 1 tablet by mouth Daily.      clindamycin (CLEOCIN) 300 MG capsule  (Patient not taking: Reported on 7/8/2025)      clobetasol propionate (TEMOVATE) 0.05 % cream Apply 1 Application topically to the appropriate area as directed 2 (Two) Times a Day. 30 g 3    Eliquis 5 MG tablet tablet Take 1 tablet by mouth 2 (Two) Times a Day.      folic acid (FOLVITE) 1 MG tablet Take 1 tablet by mouth daily. 30 tablet 5    lisinopril (PRINIVIL,ZESTRIL) 20 MG tablet Take 1 tablet by mouth Daily. (Patient not taking: Reported on 4/14/2025)       No current facility-administered medications on file prior to visit.     No Known Allergies  Past Medical History:   Diagnosis Date    Anxiety     Cancer  2024    Kidney cancer. Took out my left kidney.    History of transfusion 2024    Had to get blood for surgery and after for my hemoglobin being low.    Hypertension     Multiple gestation 1999    Normocytic anemia     Trichomonas vaginalis infection 2024    Varicella     Whwn i was a younger child.    Vitamin D deficiency      Past Surgical History:   Procedure Laterality Date    BLADDER REPAIR W/  SECTION       SECTION  2013    CHOLECYSTECTOMY      CYSTOSCOPY, RETROGRADE PYELOGRAM AND STENT INSERTION Right 3/20/2025    Procedure: CYSTOSCOPY RETROGRADE PYELOGRAM AND STENT INSERTION, right  Plain text: Scope bladder, shoot x-ray, place stent, right;  Surgeon: Veronica Lawrence MD;  Location: [facility] OR;  Service: Urology;  Laterality: Right;    LAPAROSCOPIC CHOLECYSTECTOMY      NEPHRECTOMY RADICAL Left 2024    cancer    TONSILLECTOMY      TUBAL ABDOMINAL LIGATION  2021     Social History     Socioeconomic History    Marital status:    Tobacco Use    Smoking status: Every Day     Current packs/day: 1.50     Average packs/day: 1.5 packs/day for 32.5 years (48.8 ttl pk-yrs)     Types: Cigarettes     Start date: 1993   Vaping Use    Vaping status: Never Used   Substance and Sexual Activity    Alcohol use: Never    Drug use: Never    Sexual activity: Not Currently     Partners: Male     Birth control/protection: Condom, Tubal ligation     Family History   Problem Relation Age of Onset    Hypertension Father     Diabetes Mother     Diabetes Maternal Grandmother     Breast cancer Paternal Aunt     Ovarian cancer Neg Hx     Uterine cancer Neg Hx     Colon cancer Neg Hx     Deep vein thrombosis Neg Hx     Pulmonary embolism Neg Hx      Immunization History   Administered Date(s) Administered    Fluzone (or Fluarix & Flulaval for VFC) >6mos 2020    Tdap 10/20/2020     Tobacco Use: High Risk (2025)    Patient History      "Smoking Tobacco Use: Every Day     Smokeless Tobacco Use: Unknown     Passive Exposure: Not on file     Objective     Vitals:    07/08/25 1324   BP: 122/78   Pulse: 77   Resp: 18   Temp: 98.2 °F (36.8 °C)   TempSrc: Oral   SpO2: 100%   Weight: 75.3 kg (166 lb 0.1 oz)   Height: 160 cm (62.99\")   PainSc: 0-No pain          Physical Exam  Vitals and nursing note reviewed.   Constitutional:       General: She is not in acute distress.     Appearance: Normal appearance. She is not ill-appearing.   HENT:      Head: Normocephalic.   Eyes:      Conjunctiva/sclera: Conjunctivae normal.   Cardiovascular:      Rate and Rhythm: Normal rate and regular rhythm.      Heart sounds: Normal heart sounds.   Pulmonary:      Effort: Pulmonary effort is normal.      Breath sounds: Normal breath sounds.   Abdominal:      Palpations: There is no hepatomegaly or splenomegaly.   Lymphadenopathy:      Cervical: No cervical adenopathy.      Right cervical: No superficial cervical adenopathy.     Left cervical: No superficial cervical adenopathy.      Upper Body:      Right upper body: No axillary adenopathy.      Left upper body: No axillary adenopathy.   Skin:     Coloration: Skin is not jaundiced.   Neurological:      Mental Status: She is alert.   Psychiatric:         Mood and Affect: Mood normal.         Behavior: Behavior normal. Behavior is cooperative.         Thought Content: Thought content normal.         Judgment: Judgment normal.     Wt Readings from Last 3 Encounters:   07/08/25 75.3 kg (166 lb 0.1 oz)   06/17/25 76.7 kg (169 lb 1.5 oz)   05/27/25 77.2 kg (170 lb 3.1 oz)      Body mass index is 29.41 kg/m².              ECOG: (0) Fully Active - Able to Carry On All Pre-disease Performance Without Restriction  Fall Risk Assessment was completed, and patient is at low risk for falls.  PHQ-9 Total Score:         The patient is  experiencing fatigue. Fatigue score: 7    PT/OT Functional Screening:   Speech Functional Screening: "   Rehab to be ordered:         Result Review :  The following data was reviewed by: Guy Layne MD on 07/08/25:    LABS SCANNED (10/17/2024)  LABS SCANNED (10/07/2024)  LABS SCANNED (09/30/2024)  LABS SCANNED (03/22/2024)    Labs personally reviewed.  Notable for creatinine close to baseline at 1.74.  Anemia stable.  K normal.  CO2 normal.  LFTs normal. WBC normal. Plts normal. TSH/free T4 normal. Ferritin normal.            Assessment and Plan:  Diagnoses and all orders for this visit:    1. Renal cell carcinoma of left kidney (Primary)    2. 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 CKD stage 3b, GFR 30-44 ml/min    4. Anemia, chronic disease    5. Folate deficiency    Other orders  -     OK To Treat  -     Cancel: sodium chloride 0.9 % infusion  -     Cancel: Pembrolizumab (KEYTRUDA) 200 mg in sodium chloride 0.9 % 58 mL chemo IVPB          Stage III left RCC  Left radical nephrectomy 12/6/24: Positive margin renal vein.  R1 dissection.  Grade 4.  0 of 17 lymph nodes involved.  Pathologic pT3b N0M0.  Category 1 recommendation for adjuvant pembrolizumab every 3 weeks for 1 year discussed with patient. She started pembrolizumab 2/24/25.     5/21/25: Repeat CT CAP: Fluid collection in the left hemipelvis seen. Also another 5 cm mixed density mass in right hemipelvis. These are likely post-surgical. Patient is asymptomatic. She does have worsened adenopathy which could be related to fluid collections or could be pseudoprogression. PET scan obtained 6/9/25 and no definitive metastatic disease was seen.  No FDG avidity associated lymph nodes seen.  Heterogeneous mixed density soft tissue within right lower quadrant likely represents postoperative changes.  Will plan to continue with current adjuvant treatment.    7/8/25: C7D1 adjuvant pembrolizumab 200 mg every 3 weeks. Labs appropriate to proceed - ok to treat for her CrCl which remains improved from prior. Thyroid studies normal.      RTC  C8.    Continue to monitor for severe toxicities with frequent labs and office visits.    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 Referred to nephrology. Recent GURINDER due to obstruction. Ureteral stent placed in hospital with improvement in creatinine. No hydro on recent imaging. Recently had stent replaced by Dr. Deal 6/2025. 7/8/25 creatinine stable. Follows with Dr. Rivers with nephrology.     Anemia  Likely related to RCC and CKD and recent procedures. Will need to follow this closely.  Holding iron supplementation due to elevated ferritin and combined heterozygous hemochromatosis mutations positive. Last iron studdies 6/17/25 with ferritin of 128 and last iron sat of 27%. Repeat CBC 6/17/25 with improved hgb to >11 g/dL.     Hemochromatosis  Patient with elevated ferritin to 600.  This certainly could be related to her chronic disease as well as renal cell carcinoma.  Hemochromatosis testing did detect heterozygous mutations in 2 different mutations.  This typically does not lead to clinically relevant hemochromatosis.  Further patient has low iron saturation which is not consistent with iron overload as this would be elevated. Will need to follow ferritin levels closely. Ferritin already down to 266 as of 3/21/25.  Repeat 6/17/25 now normal at 128.6. Reassurance provided. No phlebotomy for now. Will also avoid iron supplementation for now. Patient does not have elevated LFTs.  We may need to consider MRI of the liver if ferritin increases again.     Folate deficiency  Mild. Agree with daily folate supplementation. B12 normal.     Hair loss  Likely hormonal related as she is also having menstrual cycle for most days of past month. Thyroid studies normal.    Prolonged menstrual bleeding  Defer to gynecology.       Patient Follow Up: next cycle of Pembrolizumab        Patient was given instructions and counseling regarding her condition or  for health maintenance advice. Please see specific information pulled into the AVS if appropriate.

## 2025-07-23 RX ORDER — SODIUM CHLORIDE 9 MG/ML
20 INJECTION, SOLUTION INTRAVENOUS ONCE
OUTPATIENT
Start: 2025-07-29

## 2025-07-29 ENCOUNTER — HOSPITAL ENCOUNTER (OUTPATIENT)
Dept: ONCOLOGY | Facility: HOSPITAL | Age: 45
Discharge: HOME | End: 2025-07-29
Payer: COMMERCIAL

## 2025-07-29 VITALS
SYSTOLIC BLOOD PRESSURE: 128 MMHG | RESPIRATION RATE: 18 BRPM | WEIGHT: 175.3 LBS | DIASTOLIC BLOOD PRESSURE: 69 MMHG | TEMPERATURE: 97.9 F | OXYGEN SATURATION: 100 % | HEART RATE: 78 BPM | BODY MASS INDEX: 31.06 KG/M2 | HEIGHT: 63 IN

## 2025-07-29 DIAGNOSIS — C64.2 RENAL CELL CARCINOMA OF LEFT KIDNEY: ICD-10-CM

## 2025-07-29 DIAGNOSIS — C64.2 RENAL CELL CARCINOMA OF LEFT KIDNEY: Primary | ICD-10-CM

## 2025-07-29 LAB
ALBUMIN SERPL-MCNC: 4.3 G/DL (ref 3.5–5.2)
ALBUMIN/GLOB SERPL: 1.2 G/DL
ALP SERPL-CCNC: 86 U/L (ref 39–117)
ALT SERPL W P-5'-P-CCNC: 16 U/L (ref 1–33)
ANION GAP SERPL CALCULATED.3IONS-SCNC: 11.4 MMOL/L (ref 5–15)
AST SERPL-CCNC: 23 U/L (ref 1–32)
BASOPHILS # BLD AUTO: 0.04 10*3/MM3 (ref 0–0.2)
BASOPHILS NFR BLD AUTO: 0.5 % (ref 0–1.5)
BILIRUB SERPL-MCNC: 0.2 MG/DL (ref 0–1.2)
BUN SERPL-MCNC: 23.6 MG/DL (ref 6–20)
BUN/CREAT SERPL: 12 (ref 7–25)
CALCIUM SPEC-SCNC: 9.6 MG/DL (ref 8.6–10.5)
CHLORIDE SERPL-SCNC: 101 MMOL/L (ref 98–107)
CO2 SERPL-SCNC: 23.6 MMOL/L (ref 22–29)
CREAT SERPL-MCNC: 1.96 MG/DL (ref 0.57–1)
DEPRECATED RDW RBC AUTO: 51.3 FL (ref 37–54)
EGFRCR SERPLBLD CKD-EPI 2021: 31.8 ML/MIN/1.73
EOSINOPHIL # BLD AUTO: 0.34 10*3/MM3 (ref 0–0.4)
EOSINOPHIL NFR BLD AUTO: 4.6 % (ref 0.3–6.2)
ERYTHROCYTE [DISTWIDTH] IN BLOOD BY AUTOMATED COUNT: 14.8 % (ref 12.3–15.4)
GLOBULIN UR ELPH-MCNC: 3.6 GM/DL
GLUCOSE SERPL-MCNC: 96 MG/DL (ref 65–99)
HCT VFR BLD AUTO: 32 % (ref 34–46.6)
HGB BLD-MCNC: 10.8 G/DL (ref 12–15.9)
IMM GRANULOCYTES # BLD AUTO: 0.03 10*3/MM3 (ref 0–0.05)
IMM GRANULOCYTES NFR BLD AUTO: 0.4 % (ref 0–0.5)
LYMPHOCYTES # BLD AUTO: 1.12 10*3/MM3 (ref 0.7–3.1)
LYMPHOCYTES NFR BLD AUTO: 15.3 % (ref 19.6–45.3)
MCH RBC QN AUTO: 31.8 PG (ref 26.6–33)
MCHC RBC AUTO-ENTMCNC: 33.8 G/DL (ref 31.5–35.7)
MCV RBC AUTO: 94.1 FL (ref 79–97)
MONOCYTES # BLD AUTO: 0.44 10*3/MM3 (ref 0.1–0.9)
MONOCYTES NFR BLD AUTO: 6 % (ref 5–12)
NEUTROPHILS NFR BLD AUTO: 5.36 10*3/MM3 (ref 1.7–7)
NEUTROPHILS NFR BLD AUTO: 73.2 % (ref 42.7–76)
NRBC BLD AUTO-RTO: 0 /100 WBC (ref 0–0.2)
PLATELET # BLD AUTO: 270 10*3/MM3 (ref 140–450)
PMV BLD AUTO: 9.8 FL (ref 6–12)
POTASSIUM SERPL-SCNC: 4.9 MMOL/L (ref 3.5–5.2)
PROT SERPL-MCNC: 7.9 G/DL (ref 6–8.5)
RBC # BLD AUTO: 3.4 10*6/MM3 (ref 3.77–5.28)
SODIUM SERPL-SCNC: 136 MMOL/L (ref 136–145)
WBC NRBC COR # BLD AUTO: 7.33 10*3/MM3 (ref 3.4–10.8)

## 2025-07-29 PROCEDURE — 96413 CHEMO IV INFUSION 1 HR: CPT

## 2025-07-29 PROCEDURE — 85025 COMPLETE CBC W/AUTO DIFF WBC: CPT | Performed by: INTERNAL MEDICINE

## 2025-07-29 PROCEDURE — 25810000003 SODIUM CHLORIDE 0.9 % SOLUTION: Performed by: INTERNAL MEDICINE

## 2025-07-29 PROCEDURE — 80053 COMPREHEN METABOLIC PANEL: CPT | Performed by: INTERNAL MEDICINE

## 2025-07-29 PROCEDURE — 25010000002 PEMBROLIZUMAB 100 MG/4ML SOLUTION 4 ML VIAL: Performed by: INTERNAL MEDICINE

## 2025-07-29 RX ORDER — SODIUM CHLORIDE 9 MG/ML
20 INJECTION, SOLUTION INTRAVENOUS ONCE
Status: COMPLETED | OUTPATIENT
Start: 2025-07-29 | End: 2025-07-29

## 2025-07-29 RX ADMIN — SODIUM CHLORIDE 200 MG: 9 INJECTION, SOLUTION INTRAVENOUS at 11:25

## 2025-07-29 RX ADMIN — SODIUM CHLORIDE 20 ML/HR: 9 INJECTION, SOLUTION INTRAVENOUS at 11:26

## 2025-08-22 ENCOUNTER — HOSPITAL ENCOUNTER (OUTPATIENT)
Dept: ONCOLOGY | Facility: HOSPITAL | Age: 45
Discharge: HOME | End: 2025-08-22
Payer: COMMERCIAL

## 2025-08-22 ENCOUNTER — OFFICE VISIT (OUTPATIENT)
Dept: ONCOLOGY | Facility: HOSPITAL | Age: 45
End: 2025-08-22
Payer: COMMERCIAL

## 2025-08-22 VITALS
HEART RATE: 63 BPM | TEMPERATURE: 96.2 F | SYSTOLIC BLOOD PRESSURE: 130 MMHG | DIASTOLIC BLOOD PRESSURE: 78 MMHG | OXYGEN SATURATION: 100 % | RESPIRATION RATE: 18 BRPM

## 2025-08-22 VITALS
WEIGHT: 167.33 LBS | BODY MASS INDEX: 29.65 KG/M2 | TEMPERATURE: 97.5 F | HEART RATE: 80 BPM | OXYGEN SATURATION: 100 % | RESPIRATION RATE: 18 BRPM | HEIGHT: 63 IN | DIASTOLIC BLOOD PRESSURE: 86 MMHG | SYSTOLIC BLOOD PRESSURE: 133 MMHG

## 2025-08-22 DIAGNOSIS — Z14.8 CARRIER OF HEMOCHROMATOSIS HFE GENE MUTATION: ICD-10-CM

## 2025-08-22 DIAGNOSIS — C64.2 RENAL CELL CARCINOMA OF LEFT KIDNEY: Primary | ICD-10-CM

## 2025-08-22 DIAGNOSIS — N18.32 CKD STAGE 3B, GFR 30-44 ML/MIN: ICD-10-CM

## 2025-08-22 DIAGNOSIS — C64.2 RENAL CELL CARCINOMA OF LEFT KIDNEY: ICD-10-CM

## 2025-08-22 DIAGNOSIS — D64.9 ANEMIA, UNSPECIFIED TYPE: ICD-10-CM

## 2025-08-22 DIAGNOSIS — Z90.5 SOLITARY KIDNEY, ACQUIRED: ICD-10-CM

## 2025-08-22 LAB
ALBUMIN SERPL-MCNC: 4.1 G/DL (ref 3.5–5.2)
ALBUMIN/GLOB SERPL: 1.4 G/DL
ALP SERPL-CCNC: 62 U/L (ref 39–117)
ALT SERPL W P-5'-P-CCNC: 20 U/L (ref 1–33)
ANION GAP SERPL CALCULATED.3IONS-SCNC: 9.8 MMOL/L (ref 5–15)
AST SERPL-CCNC: 34 U/L (ref 1–32)
BASOPHILS # BLD AUTO: 0.04 10*3/MM3 (ref 0–0.2)
BASOPHILS NFR BLD AUTO: 0.6 % (ref 0–1.5)
BILIRUB SERPL-MCNC: 0.2 MG/DL (ref 0–1.2)
BUN SERPL-MCNC: 24.1 MG/DL (ref 6–20)
BUN/CREAT SERPL: 14.2 (ref 7–25)
CALCIUM SPEC-SCNC: 9.2 MG/DL (ref 8.6–10.5)
CHLORIDE SERPL-SCNC: 101 MMOL/L (ref 98–107)
CO2 SERPL-SCNC: 24.2 MMOL/L (ref 22–29)
CREAT SERPL-MCNC: 1.7 MG/DL (ref 0.57–1)
DEPRECATED RDW RBC AUTO: 47.3 FL (ref 37–54)
EGFRCR SERPLBLD CKD-EPI 2021: 37.8 ML/MIN/1.73
EOSINOPHIL # BLD AUTO: 0.16 10*3/MM3 (ref 0–0.4)
EOSINOPHIL NFR BLD AUTO: 2.6 % (ref 0.3–6.2)
ERYTHROCYTE [DISTWIDTH] IN BLOOD BY AUTOMATED COUNT: 13.9 % (ref 12.3–15.4)
GLOBULIN UR ELPH-MCNC: 2.9 GM/DL
GLUCOSE SERPL-MCNC: 88 MG/DL (ref 65–99)
HCT VFR BLD AUTO: 32.6 % (ref 34–46.6)
HGB BLD-MCNC: 11 G/DL (ref 12–15.9)
IMM GRANULOCYTES # BLD AUTO: 0.02 10*3/MM3 (ref 0–0.05)
IMM GRANULOCYTES NFR BLD AUTO: 0.3 % (ref 0–0.5)
LYMPHOCYTES # BLD AUTO: 1.16 10*3/MM3 (ref 0.7–3.1)
LYMPHOCYTES NFR BLD AUTO: 18.8 % (ref 19.6–45.3)
MCH RBC QN AUTO: 31.1 PG (ref 26.6–33)
MCHC RBC AUTO-ENTMCNC: 33.7 G/DL (ref 31.5–35.7)
MCV RBC AUTO: 92.1 FL (ref 79–97)
MONOCYTES # BLD AUTO: 0.45 10*3/MM3 (ref 0.1–0.9)
MONOCYTES NFR BLD AUTO: 7.3 % (ref 5–12)
NEUTROPHILS NFR BLD AUTO: 4.33 10*3/MM3 (ref 1.7–7)
NEUTROPHILS NFR BLD AUTO: 70.4 % (ref 42.7–76)
NRBC BLD AUTO-RTO: 0 /100 WBC (ref 0–0.2)
PLATELET # BLD AUTO: 224 10*3/MM3 (ref 140–450)
PMV BLD AUTO: 10 FL (ref 6–12)
POTASSIUM SERPL-SCNC: 4.7 MMOL/L (ref 3.5–5.2)
PROT SERPL-MCNC: 7 G/DL (ref 6–8.5)
RBC # BLD AUTO: 3.54 10*6/MM3 (ref 3.77–5.28)
SODIUM SERPL-SCNC: 135 MMOL/L (ref 136–145)
WBC NRBC COR # BLD AUTO: 6.16 10*3/MM3 (ref 3.4–10.8)

## 2025-08-22 PROCEDURE — 25010000002 PEMBROLIZUMAB 100 MG/4ML SOLUTION 4 ML VIAL: Performed by: INTERNAL MEDICINE

## 2025-08-22 PROCEDURE — 25810000003 SODIUM CHLORIDE 0.9 % SOLUTION: Performed by: INTERNAL MEDICINE

## 2025-08-22 PROCEDURE — 80053 COMPREHEN METABOLIC PANEL: CPT | Performed by: INTERNAL MEDICINE

## 2025-08-22 PROCEDURE — 85025 COMPLETE CBC W/AUTO DIFF WBC: CPT | Performed by: INTERNAL MEDICINE

## 2025-08-22 PROCEDURE — 96413 CHEMO IV INFUSION 1 HR: CPT

## 2025-08-22 RX ORDER — SODIUM CHLORIDE 9 MG/ML
20 INJECTION, SOLUTION INTRAVENOUS ONCE
Status: CANCELLED | OUTPATIENT
Start: 2025-08-22

## 2025-08-22 RX ORDER — SODIUM CHLORIDE 9 MG/ML
20 INJECTION, SOLUTION INTRAVENOUS ONCE
Status: COMPLETED | OUTPATIENT
Start: 2025-08-22 | End: 2025-08-22

## 2025-08-22 RX ADMIN — SODIUM CHLORIDE 200 MG: 9 INJECTION, SOLUTION INTRAVENOUS at 11:50

## 2025-08-22 RX ADMIN — SODIUM CHLORIDE 20 ML/HR: 9 INJECTION, SOLUTION INTRAVENOUS at 11:30

## (undated) DEVICE — SOL IRR NACL 0.9PCT 3000ML

## (undated) DEVICE — Device

## (undated) DEVICE — THE STERILE LIGHT HANDLE COVER IS USED WITH STERIS SURGICAL LIGHTING AND VISUALIZATION SYSTEMS.

## (undated) DEVICE — CATH URETRL FLXITP POLLACK STD 5F 70CM

## (undated) DEVICE — GOWN,SIRUS,POLYRNF,BRTHSLV,XL,30/CS: Brand: MEDLINE

## (undated) DEVICE — GW ZIPWIRE STD STR .035IN 150CM

## (undated) DEVICE — TUBING, SUCTION, 1/4" X 10', STRAIGHT: Brand: MEDLINE

## (undated) DEVICE — SKIN PREP TRAY W/CHG: Brand: MEDLINE INDUSTRIES, INC.

## (undated) DEVICE — MEDICINE CUP, GRADUATED, STER: Brand: MEDLINE

## (undated) DEVICE — GLOVE,SURG,SENSICARE SLT,LF,PF,7: Brand: MEDLINE

## (undated) DEVICE — CYSTO/BLADDER IRRIGATION SET, REGULATING CLAMP

## (undated) DEVICE — CYSTO PACK: Brand: MEDLINE INDUSTRIES, INC.

## (undated) DEVICE — TOWEL,OR,DSP,ST,BLUE,STD,4/PK,20PK/CS: Brand: MEDLINE